# Patient Record
Sex: MALE | Race: WHITE | NOT HISPANIC OR LATINO
[De-identification: names, ages, dates, MRNs, and addresses within clinical notes are randomized per-mention and may not be internally consistent; named-entity substitution may affect disease eponyms.]

---

## 2018-08-30 ENCOUNTER — CHARTING TRANS (OUTPATIENT)
Dept: OTHER | Age: 53
End: 2018-08-30

## 2018-08-31 ENCOUNTER — MYAURORA ACCOUNT LINK (OUTPATIENT)
Dept: OTHER | Age: 53
End: 2018-08-31

## 2018-09-01 ENCOUNTER — CHARTING TRANS (OUTPATIENT)
Dept: OTHER | Age: 53
End: 2018-09-01

## 2018-10-18 ENCOUNTER — IMAGING SERVICES (OUTPATIENT)
Dept: OTHER | Age: 53
End: 2018-10-18

## 2018-10-18 ENCOUNTER — MYAURORA ACCOUNT LINK (OUTPATIENT)
Dept: OTHER | Age: 53
End: 2018-10-18

## 2018-10-18 ENCOUNTER — CHARTING TRANS (OUTPATIENT)
Dept: OTHER | Age: 53
End: 2018-10-18

## 2019-03-05 VITALS
HEIGHT: 75 IN | WEIGHT: 240 LBS | DIASTOLIC BLOOD PRESSURE: 80 MMHG | BODY MASS INDEX: 29.84 KG/M2 | HEART RATE: 92 BPM | TEMPERATURE: 98.2 F | SYSTOLIC BLOOD PRESSURE: 130 MMHG

## 2019-03-12 ENCOUNTER — OFFICE VISIT (OUTPATIENT)
Dept: INTERNAL MEDICINE | Age: 54
End: 2019-03-12

## 2019-03-12 VITALS
WEIGHT: 232 LBS | TEMPERATURE: 98.6 F | HEART RATE: 80 BPM | DIASTOLIC BLOOD PRESSURE: 80 MMHG | HEIGHT: 75 IN | BODY MASS INDEX: 28.85 KG/M2 | SYSTOLIC BLOOD PRESSURE: 130 MMHG

## 2019-03-12 DIAGNOSIS — J06.9 UPPER RESPIRATORY TRACT INFECTION, UNSPECIFIED TYPE: Primary | ICD-10-CM

## 2019-03-12 DIAGNOSIS — J30.2 SEASONAL ALLERGIES: ICD-10-CM

## 2019-03-12 PROCEDURE — 99213 OFFICE O/P EST LOW 20 MIN: CPT | Performed by: INTERNAL MEDICINE

## 2019-03-12 RX ORDER — AZITHROMYCIN 250 MG/1
250 TABLET, FILM COATED ORAL ONCE
Status: DISCONTINUED | OUTPATIENT
Start: 2019-03-12 | End: 2019-03-12 | Stop reason: SDUPTHER

## 2019-03-12 RX ORDER — AZITHROMYCIN 250 MG/1
TABLET, FILM COATED ORAL
Qty: 6 TABLET | Refills: 0 | Status: SHIPPED | OUTPATIENT
Start: 2019-03-12 | End: 2020-03-20 | Stop reason: ALTCHOICE

## 2019-03-12 RX ORDER — LORATADINE 10 MG/1
TABLET ORAL
COMMUNITY
End: 2021-10-18

## 2019-03-12 RX ORDER — METHYLPREDNISOLONE 4 MG
TABLET, DOSE PACK ORAL
Qty: 21 TABLET | Refills: 0 | Status: SHIPPED | OUTPATIENT
Start: 2019-03-12 | End: 2020-03-20 | Stop reason: ALTCHOICE

## 2019-03-12 ASSESSMENT — ENCOUNTER SYMPTOMS
DIAPHORESIS: 0
COUGH: 1
SINUS PRESSURE: 1
CHEST TIGHTNESS: 0
PHOTOPHOBIA: 0
RHINORRHEA: 0
WHEEZING: 1
FATIGUE: 0
APPETITE CHANGE: 0
EYE DISCHARGE: 0
COLOR CHANGE: 0
CHILLS: 0
SINUS PAIN: 1
VOICE CHANGE: 0
EYE REDNESS: 0
FEVER: 1

## 2019-03-14 ASSESSMENT — ENCOUNTER SYMPTOMS
TROUBLE SWALLOWING: 0
SHORTNESS OF BREATH: 0
SORE THROAT: 0

## 2019-10-07 ENCOUNTER — TELEPHONE (OUTPATIENT)
Dept: INTERNAL MEDICINE | Age: 54
End: 2019-10-07

## 2020-03-20 ENCOUNTER — IMAGING SERVICES (OUTPATIENT)
Dept: GENERAL RADIOLOGY | Age: 55
End: 2020-03-20
Attending: INTERNAL MEDICINE

## 2020-03-20 ENCOUNTER — OFFICE VISIT (OUTPATIENT)
Dept: INTERNAL MEDICINE | Age: 55
End: 2020-03-20

## 2020-03-20 ENCOUNTER — TELEPHONE (OUTPATIENT)
Dept: INTERNAL MEDICINE | Age: 55
End: 2020-03-20

## 2020-03-20 VITALS
TEMPERATURE: 97 F | OXYGEN SATURATION: 96 % | HEIGHT: 75 IN | HEART RATE: 87 BPM | BODY MASS INDEX: 26.98 KG/M2 | SYSTOLIC BLOOD PRESSURE: 118 MMHG | DIASTOLIC BLOOD PRESSURE: 72 MMHG | RESPIRATION RATE: 20 BRPM | WEIGHT: 217 LBS

## 2020-03-20 DIAGNOSIS — R09.82 PND (POST-NASAL DRIP): ICD-10-CM

## 2020-03-20 DIAGNOSIS — R05.9 COUGH: ICD-10-CM

## 2020-03-20 DIAGNOSIS — R05.9 COUGH: Primary | ICD-10-CM

## 2020-03-20 DIAGNOSIS — R09.81 NASAL CONGESTION: ICD-10-CM

## 2020-03-20 PROCEDURE — 71046 X-RAY EXAM CHEST 2 VIEWS: CPT | Performed by: RADIOLOGY

## 2020-03-20 PROCEDURE — 99214 OFFICE O/P EST MOD 30 MIN: CPT | Performed by: INTERNAL MEDICINE

## 2020-03-20 RX ORDER — PREDNISONE 10 MG/1
10 TABLET ORAL DAILY
Qty: 18 TABLET | Refills: 0 | Status: SHIPPED | OUTPATIENT
Start: 2020-03-20 | End: 2020-12-15 | Stop reason: ALTCHOICE

## 2020-03-20 RX ORDER — CETIRIZINE HYDROCHLORIDE 10 MG/1
10 TABLET ORAL DAILY
COMMUNITY

## 2020-03-20 SDOH — HEALTH STABILITY: MENTAL HEALTH: HOW OFTEN DO YOU HAVE A DRINK CONTAINING ALCOHOL?: 2-3 TIMES A WEEK

## 2020-03-20 ASSESSMENT — PATIENT HEALTH QUESTIONNAIRE - PHQ9
2. FEELING DOWN, DEPRESSED OR HOPELESS: NOT AT ALL
1. LITTLE INTEREST OR PLEASURE IN DOING THINGS: NOT AT ALL
SUM OF ALL RESPONSES TO PHQ9 QUESTIONS 1 AND 2: 0
SUM OF ALL RESPONSES TO PHQ9 QUESTIONS 1 AND 2: 0

## 2020-12-12 ENCOUNTER — TELEPHONE (OUTPATIENT)
Dept: INTERNAL MEDICINE | Age: 55
End: 2020-12-12

## 2020-12-15 ENCOUNTER — OFFICE VISIT (OUTPATIENT)
Dept: INTERNAL MEDICINE | Age: 55
End: 2020-12-15

## 2020-12-15 VITALS
OXYGEN SATURATION: 98 % | TEMPERATURE: 96.7 F | SYSTOLIC BLOOD PRESSURE: 124 MMHG | BODY MASS INDEX: 29.97 KG/M2 | HEART RATE: 94 BPM | DIASTOLIC BLOOD PRESSURE: 80 MMHG | WEIGHT: 241 LBS | HEIGHT: 75 IN

## 2020-12-15 DIAGNOSIS — G56.22 ULNAR NEUROPATHY OF LEFT UPPER EXTREMITY: Primary | ICD-10-CM

## 2020-12-15 DIAGNOSIS — Z23 NEED FOR INFLUENZA VACCINATION: ICD-10-CM

## 2020-12-15 PROCEDURE — 90686 IIV4 VACC NO PRSV 0.5 ML IM: CPT

## 2020-12-15 PROCEDURE — 90471 IMMUNIZATION ADMIN: CPT

## 2020-12-15 PROCEDURE — 99213 OFFICE O/P EST LOW 20 MIN: CPT | Performed by: INTERNAL MEDICINE

## 2020-12-15 RX ORDER — METHYLPREDNISOLONE 4 MG
TABLET, DOSE PACK ORAL
Qty: 21 TABLET | Refills: 0 | Status: SHIPPED | OUTPATIENT
Start: 2020-12-15 | End: 2021-10-18

## 2020-12-15 SDOH — HEALTH STABILITY: MENTAL HEALTH: HOW OFTEN DO YOU HAVE A DRINK CONTAINING ALCOHOL?: 2-3 TIMES A WEEK

## 2020-12-15 ASSESSMENT — ENCOUNTER SYMPTOMS
HEADACHES: 0
WEAKNESS: 0
CHILLS: 0
FEVER: 0
NUMBNESS: 1
RESPIRATORY NEGATIVE: 1

## 2021-10-18 ENCOUNTER — LAB SERVICES (OUTPATIENT)
Dept: LAB | Age: 56
End: 2021-10-18

## 2021-10-18 ENCOUNTER — IMAGING SERVICES (OUTPATIENT)
Dept: GENERAL RADIOLOGY | Age: 56
End: 2021-10-18
Attending: INTERNAL MEDICINE

## 2021-10-18 ENCOUNTER — OFFICE VISIT (OUTPATIENT)
Dept: INTERNAL MEDICINE | Age: 56
End: 2021-10-18

## 2021-10-18 VITALS
HEART RATE: 105 BPM | TEMPERATURE: 98.4 F | RESPIRATION RATE: 18 BRPM | SYSTOLIC BLOOD PRESSURE: 130 MMHG | HEIGHT: 75 IN | DIASTOLIC BLOOD PRESSURE: 88 MMHG | OXYGEN SATURATION: 96 % | BODY MASS INDEX: 28.72 KG/M2 | WEIGHT: 231 LBS

## 2021-10-18 DIAGNOSIS — Z00.00 ANNUAL PHYSICAL EXAM: ICD-10-CM

## 2021-10-18 DIAGNOSIS — Z12.5 SCREENING PSA (PROSTATE SPECIFIC ANTIGEN): ICD-10-CM

## 2021-10-18 DIAGNOSIS — Z00.00 ANNUAL PHYSICAL EXAM: Primary | ICD-10-CM

## 2021-10-18 DIAGNOSIS — Z53.20 COLONOSCOPY REFUSED: ICD-10-CM

## 2021-10-18 DIAGNOSIS — M53.3 COCCYDYNIA: ICD-10-CM

## 2021-10-18 PROCEDURE — 99396 PREV VISIT EST AGE 40-64: CPT | Performed by: INTERNAL MEDICINE

## 2021-10-18 PROCEDURE — 72220 X-RAY EXAM SACRUM TAILBONE: CPT | Performed by: RADIOLOGY

## 2021-10-18 RX ORDER — ERYTHROMYCIN 250 MG/1
250 TABLET, COATED ORAL 4 TIMES DAILY
COMMUNITY
Start: 2021-10-12

## 2021-10-18 ASSESSMENT — PATIENT HEALTH QUESTIONNAIRE - PHQ9
1. LITTLE INTEREST OR PLEASURE IN DOING THINGS: NOT AT ALL
CLINICAL INTERPRETATION OF PHQ2 SCORE: NO FURTHER SCREENING NEEDED
2. FEELING DOWN, DEPRESSED OR HOPELESS: NOT AT ALL
SUM OF ALL RESPONSES TO PHQ9 QUESTIONS 1 AND 2: 0
CLINICAL INTERPRETATION OF PHQ9 SCORE: NO FURTHER SCREENING NEEDED
SUM OF ALL RESPONSES TO PHQ9 QUESTIONS 1 AND 2: 0

## 2021-10-18 ASSESSMENT — PAIN SCALES - GENERAL: PAINLEVEL: 0

## 2021-10-20 ENCOUNTER — E-ADVICE (OUTPATIENT)
Dept: INTERNAL MEDICINE | Age: 56
End: 2021-10-20

## 2021-10-20 RX ORDER — ALBUTEROL SULFATE 90 UG/1
2 AEROSOL, METERED RESPIRATORY (INHALATION) EVERY 4 HOURS PRN
Qty: 1 EACH | Refills: 0 | Status: SHIPPED | OUTPATIENT
Start: 2021-10-20 | End: 2022-05-14 | Stop reason: SDUPTHER

## 2021-10-21 DIAGNOSIS — M47.818 OSTEOARTHRITIS OF SACRAL AND SACROCOCCYGEAL SPINAL REGION, UNSPECIFIED SPINAL OSTEOARTHRITIS COMPLICATION STATUS: Primary | ICD-10-CM

## 2021-10-30 ENCOUNTER — LAB SERVICES (OUTPATIENT)
Dept: LAB | Age: 56
End: 2021-10-30

## 2021-10-30 DIAGNOSIS — Z12.5 SCREENING PSA (PROSTATE SPECIFIC ANTIGEN): ICD-10-CM

## 2021-10-30 DIAGNOSIS — Z00.00 ANNUAL PHYSICAL EXAM: ICD-10-CM

## 2021-10-30 LAB
ALBUMIN SERPL-MCNC: 4.2 G/DL (ref 3.6–5.1)
ALP SERPL-CCNC: 51 U/L (ref 45–115)
ALT SERPL W/O P-5'-P-CCNC: 46 U/L (ref 5–49)
AST SERPL-CCNC: 31 U/L (ref 14–43)
BASOPHIL %: 0.6 % (ref 0–1.2)
BASOPHIL ABSOLUTE #: 0.1 10*3/UL (ref 0–0.1)
BILIRUB SERPL-MCNC: 0.5 MG/DL (ref 0–1.3)
BUN SERPL-MCNC: 17 MG/DL (ref 6–27)
CALCIUM SERPL-MCNC: 9.5 MG/DL (ref 8.6–10.6)
CHLORIDE SERPL-SCNC: 101 MMOL/L (ref 96–107)
CHOLEST SERPL-MCNC: 97 MG/DL (ref 140–200)
CO2 SERPL-SCNC: 27 MMOL/L (ref 22–32)
CREAT SERPL-MCNC: 0.7 MG/DL (ref 0.6–1.6)
DIFFERENTIAL TYPE: ABNORMAL
EOSINOPHIL %: 3.7 % (ref 0–10)
EOSINOPHIL ABSOLUTE #: 0.4 10*3/UL (ref 0–0.5)
EST. AVERAGE GLUCOSE BLD GHB EST-MCNC: 131 MG/DL (ref 0–154)
GFR SERPL CREATININE-BSD FRML MDRD: >60 ML/MIN/{1.73M2}
GFR SERPL CREATININE-BSD FRML MDRD: >60 ML/MIN/{1.73M2}
GLUCOSE P FAST SERPL-MCNC: 126 MG/DL (ref 60–100)
HBA1C MFR BLD: 6.2 % (ref 4.2–6)
HDLC SERPL-MCNC: 39 MG/DL
HEMATOCRIT: 43.9 % (ref 40–51)
HEMOGLOBIN: 14.6 G/DL (ref 13.7–17.5)
IMMATURE GRANULOCYTE ABSOLUTE: 0.12 10*3/UL (ref 0–0.05)
IMMATURE GRANULOCYTE PERCENT: 1.2 % (ref 0–0.5)
LDLC SERPL CALC-MCNC: 45 MG/DL (ref 30–100)
LYMPH PERCENT: 29.2 % (ref 20.5–51.1)
LYMPHOCYTE ABSOLUTE #: 3 10*3/UL (ref 1.2–3.4)
MEAN CORPUSCULAR HGB CONCENTRATION: 33.3 % (ref 32–36)
MEAN CORPUSCULAR HGB: 28.6 PG (ref 27–34)
MEAN CORPUSCULAR VOLUME: 86.1 FL (ref 79–95)
MEAN PLATELET VOLUME: 10.2 FL (ref 8.6–12.4)
MONOCYTE ABSOLUTE #: 0.8 10*3/UL (ref 0.2–0.9)
MONOCYTE PERCENT: 7.5 % (ref 4.3–12.9)
NEUTROPHIL ABSOLUTE #: 5.9 10*3/UL (ref 1.4–6.5)
NEUTROPHIL PERCENT: 57.8 % (ref 34–73.5)
PLATELET COUNT: 355 10*3/UL (ref 150–400)
POTASSIUM SERPL-SCNC: 4.3 MMOL/L (ref 3.5–5.3)
PROT SERPL-MCNC: 7 G/DL (ref 6.4–8.5)
PSA SERPL-MCNC: 0.5 NG/ML (ref 0–3.2)
RED BLOOD CELL COUNT: 5.1 10*6/UL (ref 3.9–5.7)
RED CELL DISTRIBUTION WIDTH: 13.7 % (ref 11.3–14.8)
SODIUM SERPL-SCNC: 139 MMOL/L (ref 136–146)
TRIGL SERPL-MCNC: 65 MG/DL (ref 0–200)
TSH SERPL DL<=0.05 MIU/L-ACNC: 1.17 M[IU]/L (ref 0.3–4.82)
WHITE BLOOD CELL COUNT: 10.2 10*3/UL (ref 4–10)

## 2021-10-30 PROCEDURE — 80050 GENERAL HEALTH PANEL: CPT | Performed by: INTERNAL MEDICINE

## 2021-10-30 PROCEDURE — 36415 COLL VENOUS BLD VENIPUNCTURE: CPT | Performed by: INTERNAL MEDICINE

## 2021-10-30 PROCEDURE — G0103 PSA SCREENING: HCPCS | Performed by: INTERNAL MEDICINE

## 2021-10-30 PROCEDURE — 80061 LIPID PANEL: CPT | Performed by: INTERNAL MEDICINE

## 2021-10-30 PROCEDURE — 83036 HEMOGLOBIN GLYCOSYLATED A1C: CPT | Performed by: INTERNAL MEDICINE

## 2021-11-01 DIAGNOSIS — R73.09 ELEVATED GLUCOSE: Primary | ICD-10-CM

## 2021-11-26 ENCOUNTER — TELEPHONE (OUTPATIENT)
Dept: NUTRITION | Age: 56
End: 2021-11-26

## 2022-05-14 RX ORDER — ALBUTEROL SULFATE 90 UG/1
AEROSOL, METERED RESPIRATORY (INHALATION)
Qty: 8.5 G | Refills: 0 | Status: SHIPPED | OUTPATIENT
Start: 2022-05-14

## 2022-06-13 ENCOUNTER — WALK IN (OUTPATIENT)
Dept: URGENT CARE | Age: 57
End: 2022-06-13

## 2022-06-13 VITALS
RESPIRATION RATE: 16 BRPM | OXYGEN SATURATION: 96 % | DIASTOLIC BLOOD PRESSURE: 70 MMHG | SYSTOLIC BLOOD PRESSURE: 130 MMHG | HEART RATE: 88 BPM | TEMPERATURE: 97.3 F

## 2022-06-13 DIAGNOSIS — U07.1 COVID-19 VIRUS INFECTION: Primary | ICD-10-CM

## 2022-06-13 PROCEDURE — 99214 OFFICE O/P EST MOD 30 MIN: CPT | Performed by: INTERNAL MEDICINE

## 2022-06-13 RX ORDER — DEXLANSOPRAZOLE 60 MG/1
60 CAPSULE, DELAYED RELEASE ORAL DAILY
COMMUNITY
Start: 2022-05-14

## 2022-06-13 RX ORDER — ALBUTEROL SULFATE 90 UG/1
AEROSOL, METERED RESPIRATORY (INHALATION)
Qty: 1 EACH | Refills: 0 | Status: SHIPPED | OUTPATIENT
Start: 2022-06-13

## 2022-06-13 RX ORDER — PREDNISONE 20 MG/1
40 TABLET ORAL DAILY
Qty: 10 TABLET | Refills: 0 | Status: SHIPPED | OUTPATIENT
Start: 2022-06-13 | End: 2022-06-18

## 2022-11-15 PROBLEM — M53.3 COCCYDYNIA: Status: ACTIVE | Noted: 2021-10-18

## 2022-11-15 PROBLEM — J30.2 SEASONAL ALLERGIES: Status: ACTIVE | Noted: 2022-11-15

## 2022-12-13 ENCOUNTER — LAB ENCOUNTER (OUTPATIENT)
Dept: LAB | Age: 57
End: 2022-12-13
Attending: STUDENT IN AN ORGANIZED HEALTH CARE EDUCATION/TRAINING PROGRAM
Payer: COMMERCIAL

## 2022-12-13 DIAGNOSIS — Z01.818 PRE-OP TESTING: ICD-10-CM

## 2022-12-13 DIAGNOSIS — R12 HEARTBURN: ICD-10-CM

## 2022-12-13 LAB
ALBUMIN SERPL-MCNC: 3.9 G/DL (ref 3.4–5)
ALBUMIN/GLOB SERPL: 1.1 {RATIO} (ref 1–2)
ALP LIVER SERPL-CCNC: 48 U/L
ALT SERPL-CCNC: 109 U/L
ANION GAP SERPL CALC-SCNC: 7 MMOL/L (ref 0–18)
AST SERPL-CCNC: 73 U/L (ref 15–37)
BASOPHILS # BLD AUTO: 0.02 X10(3) UL (ref 0–0.2)
BASOPHILS NFR BLD AUTO: 0.2 %
BILIRUB SERPL-MCNC: 0.8 MG/DL (ref 0.1–2)
BUN BLD-MCNC: 13 MG/DL (ref 7–18)
CALCIUM BLD-MCNC: 9.1 MG/DL (ref 8.5–10.1)
CHLORIDE SERPL-SCNC: 107 MMOL/L (ref 98–112)
CO2 SERPL-SCNC: 26 MMOL/L (ref 21–32)
CREAT BLD-MCNC: 0.85 MG/DL
EOSINOPHIL # BLD AUTO: 0.19 X10(3) UL (ref 0–0.7)
EOSINOPHIL NFR BLD AUTO: 2.3 %
ERYTHROCYTE [DISTWIDTH] IN BLOOD BY AUTOMATED COUNT: 13.7 %
FASTING STATUS PATIENT QL REPORTED: YES
GFR SERPLBLD BASED ON 1.73 SQ M-ARVRAT: 103 ML/MIN/1.73M2 (ref 60–?)
GLOBULIN PLAS-MCNC: 3.4 G/DL (ref 2.8–4.4)
GLUCOSE BLD-MCNC: 138 MG/DL (ref 70–99)
HCT VFR BLD AUTO: 45.4 %
HGB BLD-MCNC: 15.2 G/DL
IMM GRANULOCYTES # BLD AUTO: 0.03 X10(3) UL (ref 0–1)
IMM GRANULOCYTES NFR BLD: 0.4 %
LYMPHOCYTES # BLD AUTO: 1.96 X10(3) UL (ref 1–4)
LYMPHOCYTES NFR BLD AUTO: 24.1 %
MCH RBC QN AUTO: 29 PG (ref 26–34)
MCHC RBC AUTO-ENTMCNC: 33.5 G/DL (ref 31–37)
MCV RBC AUTO: 86.5 FL
MONOCYTES # BLD AUTO: 0.61 X10(3) UL (ref 0.1–1)
MONOCYTES NFR BLD AUTO: 7.5 %
NEUTROPHILS # BLD AUTO: 5.33 X10 (3) UL (ref 1.5–7.7)
NEUTROPHILS # BLD AUTO: 5.33 X10(3) UL (ref 1.5–7.7)
NEUTROPHILS NFR BLD AUTO: 65.5 %
OSMOLALITY SERPL CALC.SUM OF ELEC: 292 MOSM/KG (ref 275–295)
PLATELET # BLD AUTO: 279 10(3)UL (ref 150–450)
POTASSIUM SERPL-SCNC: 3.8 MMOL/L (ref 3.5–5.1)
PROT SERPL-MCNC: 7.3 G/DL (ref 6.4–8.2)
RBC # BLD AUTO: 5.25 X10(6)UL
SODIUM SERPL-SCNC: 140 MMOL/L (ref 136–145)
WBC # BLD AUTO: 8.1 X10(3) UL (ref 4–11)

## 2022-12-13 PROCEDURE — 36415 COLL VENOUS BLD VENIPUNCTURE: CPT

## 2022-12-13 PROCEDURE — 80053 COMPREHEN METABOLIC PANEL: CPT

## 2022-12-13 PROCEDURE — 85025 COMPLETE CBC W/AUTO DIFF WBC: CPT

## 2022-12-14 LAB — SARS-COV-2 RNA RESP QL NAA+PROBE: NOT DETECTED

## 2022-12-14 NOTE — PROGRESS NOTES
RUQ US  CMP repeat; hepatitis panel, MICKI/ASMA,AMA; a1at; ceruloplasmin, ferritin, IgG;     ----      Raza Michelle,    Your recent blood work shows a normal red blood cell count. Your liver enzymes are elevated. I recommend additional blood work and an ultrasound of your liver for further evaluation. I have placed these orders for you. I will plan to see you on Friday for your upper endoscopy and colonoscopy,       Your blood sugar is high, I recommend you follow-up with your primary care physician for further evaluation of this. Please let me know if you have any questions or concerns.      Take care and Sincerely,  Casey Murphy MD

## 2022-12-16 PROBLEM — R12 HEARTBURN: Status: ACTIVE | Noted: 2022-12-16

## 2022-12-16 PROBLEM — K22.2 ESOPHAGEAL STRICTURE: Status: ACTIVE | Noted: 2022-12-16

## 2022-12-16 PROBLEM — K20.80 CORROSIVE ESOPHAGITIS: Status: ACTIVE | Noted: 2022-12-16

## 2022-12-16 PROBLEM — Z12.11 SPECIAL SCREENING FOR MALIGNANT NEOPLASM OF COLON: Status: ACTIVE | Noted: 2022-12-16

## 2023-02-09 ENCOUNTER — LAB ENCOUNTER (OUTPATIENT)
Dept: LAB | Age: 58
End: 2023-02-09
Attending: STUDENT IN AN ORGANIZED HEALTH CARE EDUCATION/TRAINING PROGRAM
Payer: COMMERCIAL

## 2023-02-09 ENCOUNTER — HOSPITAL ENCOUNTER (OUTPATIENT)
Dept: ULTRASOUND IMAGING | Age: 58
Discharge: HOME | End: 2023-02-09
Attending: STUDENT IN AN ORGANIZED HEALTH CARE EDUCATION/TRAINING PROGRAM
Payer: COMMERCIAL

## 2023-02-09 DIAGNOSIS — R74.8 ELEVATED LIVER ENZYMES: Primary | ICD-10-CM

## 2023-02-09 DIAGNOSIS — R74.8 ELEVATED LIVER ENZYMES: ICD-10-CM

## 2023-02-09 LAB
CERULOPLASMIN SERPL-MCNC: 23.8 MG/DL (ref 20–60)
DEPRECATED HBV CORE AB SER IA-ACNC: 60.6 NG/ML
HAV IGM SER QL: NONREACTIVE
HBV CORE AB SERPL QL IA: NONREACTIVE
HBV CORE IGM SER QL: NONREACTIVE
HBV SURFACE AG SERPL QL IA: NONREACTIVE
HCV AB SERPL QL IA: NONREACTIVE
IMMUNOGLOBULIN PNL SER-MCNC: 798 MG/DL (ref 791–1643)

## 2023-02-09 PROCEDURE — 86038 ANTINUCLEAR ANTIBODIES: CPT

## 2023-02-09 PROCEDURE — 82103 ALPHA-1-ANTITRYPSIN TOTAL: CPT

## 2023-02-09 PROCEDURE — 86704 HEP B CORE ANTIBODY TOTAL: CPT

## 2023-02-09 PROCEDURE — 86225 DNA ANTIBODY NATIVE: CPT

## 2023-02-09 PROCEDURE — 83516 IMMUNOASSAY NONANTIBODY: CPT

## 2023-02-09 PROCEDURE — 76700 US EXAM ABDOM COMPLETE: CPT | Performed by: STUDENT IN AN ORGANIZED HEALTH CARE EDUCATION/TRAINING PROGRAM

## 2023-02-09 PROCEDURE — 80074 ACUTE HEPATITIS PANEL: CPT

## 2023-02-09 PROCEDURE — 82784 ASSAY IGA/IGD/IGG/IGM EACH: CPT

## 2023-02-09 PROCEDURE — 82728 ASSAY OF FERRITIN: CPT

## 2023-02-09 PROCEDURE — 82390 ASSAY OF CERULOPLASMIN: CPT

## 2023-02-09 PROCEDURE — 36415 COLL VENOUS BLD VENIPUNCTURE: CPT

## 2023-02-10 LAB
DSDNA IGG SERPL IA-ACNC: <0.6 IU/ML
ENA AB SER QL IA: 1.3 UG/L
ENA AB SER QL IA: POSITIVE

## 2023-02-11 LAB
F-ACTIN (SMOOTH MUSCLE) AB: 4 UNITS
MITOCHONDRIAL M2 AB, IGG: 2.3 UNITS

## 2023-02-16 LAB
ALPHA 1 ANTITRYPSIN S ALLELE: NEGATIVE
ALPHA 1 ANTITYPSIN Z ALLELE: NEGATIVE
ALPHA-1-ANTITRYPSIN: 149 MG/DL

## 2023-03-02 ENCOUNTER — OFFICE VISIT (OUTPATIENT)
Dept: INTERNAL MEDICINE CLINIC | Facility: CLINIC | Age: 58
End: 2023-03-02
Payer: COMMERCIAL

## 2023-03-02 VITALS
TEMPERATURE: 97 F | BODY MASS INDEX: 28.77 KG/M2 | WEIGHT: 224.19 LBS | OXYGEN SATURATION: 97 % | RESPIRATION RATE: 16 BRPM | SYSTOLIC BLOOD PRESSURE: 128 MMHG | HEIGHT: 74 IN | DIASTOLIC BLOOD PRESSURE: 76 MMHG | HEART RATE: 82 BPM

## 2023-03-02 DIAGNOSIS — Z13.220 SCREENING FOR LIPID DISORDERS: ICD-10-CM

## 2023-03-02 DIAGNOSIS — Z13.29 SCREENING FOR THYROID DISORDER: ICD-10-CM

## 2023-03-02 DIAGNOSIS — Z12.5 SCREENING FOR PROSTATE CANCER: ICD-10-CM

## 2023-03-02 DIAGNOSIS — K21.00 GASTROESOPHAGEAL REFLUX DISEASE WITH ESOPHAGITIS WITHOUT HEMORRHAGE: ICD-10-CM

## 2023-03-02 DIAGNOSIS — R73.03 PREDIABETES: ICD-10-CM

## 2023-03-02 DIAGNOSIS — I34.1 MVP (MITRAL VALVE PROLAPSE): ICD-10-CM

## 2023-03-02 DIAGNOSIS — Z00.00 LABORATORY EXAMINATION ORDERED AS PART OF A COMPLETE PHYSICAL EXAMINATION: Primary | ICD-10-CM

## 2023-03-02 DIAGNOSIS — Z13.228 SCREENING FOR METABOLIC DISORDER: ICD-10-CM

## 2023-03-02 DIAGNOSIS — Z13.0 SCREENING FOR BLOOD DISEASE: ICD-10-CM

## 2023-03-02 PROCEDURE — 3008F BODY MASS INDEX DOCD: CPT | Performed by: INTERNAL MEDICINE

## 2023-03-02 PROCEDURE — 3078F DIAST BP <80 MM HG: CPT | Performed by: INTERNAL MEDICINE

## 2023-03-02 PROCEDURE — 99204 OFFICE O/P NEW MOD 45 MIN: CPT | Performed by: INTERNAL MEDICINE

## 2023-03-02 PROCEDURE — 3074F SYST BP LT 130 MM HG: CPT | Performed by: INTERNAL MEDICINE

## 2023-03-02 RX ORDER — ERYTHROMYCIN 250 MG/1
250 TABLET, COATED ORAL AS NEEDED
COMMUNITY
Start: 2023-01-11

## 2023-03-07 ENCOUNTER — LAB ENCOUNTER (OUTPATIENT)
Dept: LAB | Age: 58
End: 2023-03-07
Attending: STUDENT IN AN ORGANIZED HEALTH CARE EDUCATION/TRAINING PROGRAM
Payer: COMMERCIAL

## 2023-03-07 ENCOUNTER — HOSPITAL ENCOUNTER (OUTPATIENT)
Dept: ULTRASOUND IMAGING | Age: 58
Discharge: HOME | End: 2023-03-07
Attending: STUDENT IN AN ORGANIZED HEALTH CARE EDUCATION/TRAINING PROGRAM
Payer: COMMERCIAL

## 2023-03-07 DIAGNOSIS — K76.0 FATTY LIVER: ICD-10-CM

## 2023-03-07 DIAGNOSIS — Z13.228 SCREENING FOR METABOLIC DISORDER: ICD-10-CM

## 2023-03-07 DIAGNOSIS — Z13.29 SCREENING FOR THYROID DISORDER: ICD-10-CM

## 2023-03-07 DIAGNOSIS — Z13.0 SCREENING FOR BLOOD DISEASE: ICD-10-CM

## 2023-03-07 DIAGNOSIS — R73.03 PREDIABETES: ICD-10-CM

## 2023-03-07 DIAGNOSIS — Z00.00 LABORATORY EXAMINATION ORDERED AS PART OF A COMPLETE PHYSICAL EXAMINATION: ICD-10-CM

## 2023-03-07 DIAGNOSIS — Z12.5 SCREENING FOR PROSTATE CANCER: ICD-10-CM

## 2023-03-07 DIAGNOSIS — Z13.220 SCREENING FOR LIPID DISORDERS: ICD-10-CM

## 2023-03-07 LAB
AFP-TM SERPL-MCNC: 1.4 NG/ML (ref ?–8)
ALBUMIN SERPL-MCNC: 3.8 G/DL (ref 3.4–5)
ALBUMIN/GLOB SERPL: 1.2 {RATIO} (ref 1–2)
ALP LIVER SERPL-CCNC: 45 U/L
ALT SERPL-CCNC: 71 U/L
ANION GAP SERPL CALC-SCNC: 5 MMOL/L (ref 0–18)
AST SERPL-CCNC: 37 U/L (ref 15–37)
BASOPHILS # BLD AUTO: 0.02 X10(3) UL (ref 0–0.2)
BASOPHILS NFR BLD AUTO: 0.3 %
BILIRUB SERPL-MCNC: 0.7 MG/DL (ref 0.1–2)
BUN BLD-MCNC: 14 MG/DL (ref 7–18)
CALCIUM BLD-MCNC: 9 MG/DL (ref 8.5–10.1)
CHLORIDE SERPL-SCNC: 106 MMOL/L (ref 98–112)
CHOLEST SERPL-MCNC: 83 MG/DL (ref ?–200)
CO2 SERPL-SCNC: 29 MMOL/L (ref 21–32)
COMPLEXED PSA SERPL-MCNC: 0.43 NG/ML (ref ?–4)
CREAT BLD-MCNC: 0.77 MG/DL
EOSINOPHIL # BLD AUTO: 0.25 X10(3) UL (ref 0–0.7)
EOSINOPHIL NFR BLD AUTO: 3.7 %
ERYTHROCYTE [DISTWIDTH] IN BLOOD BY AUTOMATED COUNT: 13.8 %
EST. AVERAGE GLUCOSE BLD GHB EST-MCNC: 134 MG/DL (ref 68–126)
FASTING PATIENT LIPID ANSWER: YES
FASTING STATUS PATIENT QL REPORTED: YES
GFR SERPLBLD BASED ON 1.73 SQ M-ARVRAT: 106 ML/MIN/1.73M2 (ref 60–?)
GLOBULIN PLAS-MCNC: 3.3 G/DL (ref 2.8–4.4)
GLUCOSE BLD-MCNC: 112 MG/DL (ref 70–99)
HBA1C MFR BLD: 6.3 % (ref ?–5.7)
HCT VFR BLD AUTO: 43 %
HDLC SERPL-MCNC: 36 MG/DL (ref 40–59)
HGB BLD-MCNC: 14.6 G/DL
IMM GRANULOCYTES # BLD AUTO: 0.02 X10(3) UL (ref 0–1)
IMM GRANULOCYTES NFR BLD: 0.3 %
LDLC SERPL CALC-MCNC: 34 MG/DL (ref ?–100)
LYMPHOCYTES # BLD AUTO: 2.13 X10(3) UL (ref 1–4)
LYMPHOCYTES NFR BLD AUTO: 31.6 %
MCH RBC QN AUTO: 28.5 PG (ref 26–34)
MCHC RBC AUTO-ENTMCNC: 34 G/DL (ref 31–37)
MCV RBC AUTO: 84 FL
MONOCYTES # BLD AUTO: 0.53 X10(3) UL (ref 0.1–1)
MONOCYTES NFR BLD AUTO: 7.9 %
NEUTROPHILS # BLD AUTO: 3.78 X10 (3) UL (ref 1.5–7.7)
NEUTROPHILS # BLD AUTO: 3.78 X10(3) UL (ref 1.5–7.7)
NEUTROPHILS NFR BLD AUTO: 56.2 %
NONHDLC SERPL-MCNC: 47 MG/DL (ref ?–130)
OSMOLALITY SERPL CALC.SUM OF ELEC: 291 MOSM/KG (ref 275–295)
PLATELET # BLD AUTO: 260 10(3)UL (ref 150–450)
POTASSIUM SERPL-SCNC: 3.6 MMOL/L (ref 3.5–5.1)
PROT SERPL-MCNC: 7.1 G/DL (ref 6.4–8.2)
RBC # BLD AUTO: 5.12 X10(6)UL
SODIUM SERPL-SCNC: 140 MMOL/L (ref 136–145)
TRIGL SERPL-MCNC: 50 MG/DL (ref 30–149)
TSI SER-ACNC: 1.26 MIU/ML (ref 0.36–3.74)
VLDLC SERPL CALC-MCNC: 7 MG/DL (ref 0–30)
WBC # BLD AUTO: 6.7 X10(3) UL (ref 4–11)

## 2023-03-07 PROCEDURE — 76705 ECHO EXAM OF ABDOMEN: CPT | Performed by: STUDENT IN AN ORGANIZED HEALTH CARE EDUCATION/TRAINING PROGRAM

## 2023-03-07 PROCEDURE — 76981 USE PARENCHYMA: CPT | Performed by: STUDENT IN AN ORGANIZED HEALTH CARE EDUCATION/TRAINING PROGRAM

## 2023-03-07 PROCEDURE — 80061 LIPID PANEL: CPT

## 2023-03-07 PROCEDURE — 82105 ALPHA-FETOPROTEIN SERUM: CPT

## 2023-03-07 PROCEDURE — 83036 HEMOGLOBIN GLYCOSYLATED A1C: CPT

## 2023-03-07 PROCEDURE — 85025 COMPLETE CBC W/AUTO DIFF WBC: CPT

## 2023-03-07 PROCEDURE — 80053 COMPREHEN METABOLIC PANEL: CPT

## 2023-03-07 PROCEDURE — 84443 ASSAY THYROID STIM HORMONE: CPT

## 2023-03-07 PROCEDURE — 36415 COLL VENOUS BLD VENIPUNCTURE: CPT

## 2023-03-14 NOTE — PROGRESS NOTES
RUSELMA OGDEN 1 year    ---      Raaz Michelle,    Your recent ultrasound showed a fatty liver. This is where extra fat deposits within the liver. However, if fatty liver is left untreated, this can potentially lead to other liver and heart issues and conditions. I recommend that you follow a low fat and low calorie diet, with a diet high in fruits and vegetables. I also recommend that you lose about 6-8% of your body weight with diet and exercise in the next 6-12 months, to help minimize the risk of fatty liver. There are also multiple cysts noted in your liver. I recommend we repeat this ultrasound in 1 year to assess for any changes in your liver and surveillance of cysts of the liver. Please let me know if you have any questions or concerns.      Sincerely,  Cheryl Lauren MD

## 2023-03-14 NOTE — PROGRESS NOTES
Cj Clifton,    Your recent blood work for alpha-fetoprotein is normal.     Please let me know if you have any questions or concerns.      Sincerely,  Cecile Spivey MD

## 2023-03-15 ENCOUNTER — WALK IN (OUTPATIENT)
Dept: URGENT CARE | Age: 58
End: 2023-03-15

## 2023-03-15 VITALS
OXYGEN SATURATION: 98 % | DIASTOLIC BLOOD PRESSURE: 96 MMHG | TEMPERATURE: 98.7 F | HEART RATE: 108 BPM | RESPIRATION RATE: 16 BRPM | SYSTOLIC BLOOD PRESSURE: 137 MMHG

## 2023-03-15 DIAGNOSIS — H10.9 CONJUNCTIVITIS OF RIGHT EYE, UNSPECIFIED CONJUNCTIVITIS TYPE: Primary | ICD-10-CM

## 2023-03-15 DIAGNOSIS — J01.90 ACUTE NON-RECURRENT SINUSITIS, UNSPECIFIED LOCATION: ICD-10-CM

## 2023-03-15 PROCEDURE — 99214 OFFICE O/P EST MOD 30 MIN: CPT | Performed by: FAMILY MEDICINE

## 2023-03-15 RX ORDER — TOBRAMYCIN 3 MG/ML
SOLUTION/ DROPS OPHTHALMIC
Qty: 1 EACH | Refills: 0 | Status: SHIPPED | OUTPATIENT
Start: 2023-03-15

## 2023-03-15 RX ORDER — AZITHROMYCIN 250 MG/1
TABLET, FILM COATED ORAL
Qty: 6 TABLET | Refills: 0 | Status: SHIPPED | OUTPATIENT
Start: 2023-03-15 | End: 2023-03-20

## 2023-03-15 RX ORDER — PREDNISONE 20 MG/1
40 TABLET ORAL DAILY
Qty: 10 TABLET | Refills: 0 | Status: SHIPPED | OUTPATIENT
Start: 2023-03-15 | End: 2023-03-20

## 2023-04-20 ENCOUNTER — HOSPITAL ENCOUNTER (OUTPATIENT)
Dept: CV DIAGNOSTICS | Facility: HOSPITAL | Age: 58
Discharge: HOME | End: 2023-04-20
Attending: INTERNAL MEDICINE
Payer: COMMERCIAL

## 2023-04-20 DIAGNOSIS — I34.1 MVP (MITRAL VALVE PROLAPSE): ICD-10-CM

## 2023-04-20 PROCEDURE — 93306 TTE W/DOPPLER COMPLETE: CPT | Performed by: INTERNAL MEDICINE

## 2023-05-11 ENCOUNTER — TELEMEDICINE (OUTPATIENT)
Dept: INTERNAL MEDICINE CLINIC | Facility: CLINIC | Age: 58
End: 2023-05-11
Payer: COMMERCIAL

## 2023-05-11 DIAGNOSIS — U07.1 COVID-19: Primary | ICD-10-CM

## 2023-05-11 DIAGNOSIS — R06.02 SHORTNESS OF BREATH: ICD-10-CM

## 2023-05-11 PROCEDURE — 99213 OFFICE O/P EST LOW 20 MIN: CPT | Performed by: INTERNAL MEDICINE

## 2023-05-11 RX ORDER — ALBUTEROL SULFATE 90 UG/1
1 AEROSOL, METERED RESPIRATORY (INHALATION) EVERY 6 HOURS PRN
Qty: 1 EACH | Refills: 0 | Status: SHIPPED | OUTPATIENT
Start: 2023-05-11

## 2023-05-11 RX ORDER — PREDNISONE 20 MG/1
20 TABLET ORAL DAILY
Qty: 5 TABLET | Refills: 0 | Status: SHIPPED | OUTPATIENT
Start: 2023-05-11 | End: 2023-05-16

## 2023-07-05 ENCOUNTER — TELEPHONE (OUTPATIENT)
Dept: INTERNAL MEDICINE CLINIC | Facility: CLINIC | Age: 58
End: 2023-07-05

## 2023-07-05 ENCOUNTER — OFFICE VISIT (OUTPATIENT)
Dept: FAMILY MEDICINE CLINIC | Facility: CLINIC | Age: 58
End: 2023-07-05
Payer: COMMERCIAL

## 2023-07-05 VITALS
HEART RATE: 71 BPM | HEIGHT: 75 IN | BODY MASS INDEX: 28.35 KG/M2 | SYSTOLIC BLOOD PRESSURE: 126 MMHG | WEIGHT: 228 LBS | TEMPERATURE: 98 F | RESPIRATION RATE: 16 BRPM | OXYGEN SATURATION: 96 % | DIASTOLIC BLOOD PRESSURE: 80 MMHG

## 2023-07-05 DIAGNOSIS — K64.5 EXTERNAL HEMORRHOID, THROMBOSED: Primary | ICD-10-CM

## 2023-07-05 PROCEDURE — 3008F BODY MASS INDEX DOCD: CPT | Performed by: NURSE PRACTITIONER

## 2023-07-05 PROCEDURE — 3074F SYST BP LT 130 MM HG: CPT | Performed by: NURSE PRACTITIONER

## 2023-07-05 PROCEDURE — 3079F DIAST BP 80-89 MM HG: CPT | Performed by: NURSE PRACTITIONER

## 2023-07-05 PROCEDURE — 99213 OFFICE O/P EST LOW 20 MIN: CPT | Performed by: NURSE PRACTITIONER

## 2023-07-05 RX ORDER — PANTOPRAZOLE SODIUM 40 MG/1
40 TABLET, DELAYED RELEASE ORAL
COMMUNITY
Start: 2023-06-23

## 2023-07-05 RX ORDER — HYDROCORTISONE 25 MG/G
1 CREAM TOPICAL 2 TIMES DAILY
Qty: 28 G | Refills: 0 | Status: SHIPPED | OUTPATIENT
Start: 2023-07-05 | End: 2023-07-12

## 2023-07-05 NOTE — TELEPHONE ENCOUNTER
Rectal bleeding x 1 day. Had drops of blood in his pants. Thinks it's a hemmohoid. He is en route to UC. Follow up with  him tomorrow please.

## 2023-07-07 ENCOUNTER — OFFICE VISIT (OUTPATIENT)
Dept: INTERNAL MEDICINE CLINIC | Facility: CLINIC | Age: 58
End: 2023-07-07
Payer: COMMERCIAL

## 2023-07-07 VITALS
BODY MASS INDEX: 29 KG/M2 | WEIGHT: 229.81 LBS | OXYGEN SATURATION: 98 % | SYSTOLIC BLOOD PRESSURE: 136 MMHG | HEART RATE: 60 BPM | DIASTOLIC BLOOD PRESSURE: 84 MMHG

## 2023-07-07 DIAGNOSIS — K21.00 GASTROESOPHAGEAL REFLUX DISEASE WITH ESOPHAGITIS WITHOUT HEMORRHAGE: ICD-10-CM

## 2023-07-07 DIAGNOSIS — K64.4 EXTERNAL HEMORRHOID: Primary | ICD-10-CM

## 2023-07-07 DIAGNOSIS — R73.03 PREDIABETES: ICD-10-CM

## 2023-07-07 PROBLEM — I34.1 MVP (MITRAL VALVE PROLAPSE): Status: RESOLVED | Noted: 2023-03-02 | Resolved: 2023-07-07

## 2023-07-07 PROCEDURE — 99214 OFFICE O/P EST MOD 30 MIN: CPT | Performed by: INTERNAL MEDICINE

## 2023-07-07 PROCEDURE — 3079F DIAST BP 80-89 MM HG: CPT | Performed by: INTERNAL MEDICINE

## 2023-07-07 PROCEDURE — 3075F SYST BP GE 130 - 139MM HG: CPT | Performed by: INTERNAL MEDICINE

## 2023-07-10 ENCOUNTER — OFFICE VISIT (OUTPATIENT)
Facility: LOCATION | Age: 58
End: 2023-07-10
Payer: COMMERCIAL

## 2023-07-10 VITALS — TEMPERATURE: 98 F | HEART RATE: 96 BPM

## 2023-07-10 DIAGNOSIS — K64.5 THROMBOSED HEMORRHOIDS: Primary | ICD-10-CM

## 2023-08-14 ENCOUNTER — OFFICE VISIT (OUTPATIENT)
Facility: LOCATION | Age: 58
End: 2023-08-14
Payer: COMMERCIAL

## 2023-08-14 DIAGNOSIS — K64.5 THROMBOSED HEMORRHOIDS: Primary | ICD-10-CM

## 2023-08-14 PROCEDURE — 99213 OFFICE O/P EST LOW 20 MIN: CPT | Performed by: STUDENT IN AN ORGANIZED HEALTH CARE EDUCATION/TRAINING PROGRAM

## 2023-08-15 NOTE — PROGRESS NOTES
Follow Up Visit Note       Active Problems      1. Thrombosed hemorrhoids          Chief Complaint   Patient presents with:  Establish Care: EP - 1 month f/u hemorrhoids, healing well, antibiotic, been taking more bathes, no symptoms. History of Present Illness  This is a very nice 72-year-old gentleman who I met on 7/10/2023 when he was referred to me from his primary care physician, Dr. Eric Savage, with rectal pain secondary to a thrombosed hemorrhoid. I recommended nonoperative therapy with Anusol, sitz bath's and high-fiber diet. Patient is doing very well at this time. He is no longer having any anorectal pain. He is no longer using any topical medication. He is not having any bleeding, prolapse, seepage, itching or incontinence. He is still doing sitz bath's. Allergies  Miguel Angel is allergic to penicillins. Past Medical / Surgical / Social / Family History    The past medical and past surgical history have been reviewed by me today. Past Medical History:   Diagnosis Date    Belching     Esophageal reflux     Heartburn     Indigestion     Shortness of breath     Wears glasses      Past Surgical History:   Procedure Laterality Date    COLONOSCOPY  12/2022    HERNIA SURGERY         The family history and social history have been reviewed by me today. Family History   Problem Relation Age of Onset    Cancer Father     Diabetes Mother     Diabetes Son     Dementia Maternal Grandmother      Social History    Socioeconomic History      Marital status:     Tobacco Use      Smoking status: Never      Smokeless tobacco: Never    Substance and Sexual Activity      Alcohol use: Yes        Alcohol/week: 6.0 standard drinks of alcohol        Types: 6 Cans of beer per week      Drug use: Never    Other Topics      Concerns:        Weight Concern: Yes       Current Outpatient Medications:     Lidocaine 4 % External Ointment, Apply 50 g topically in the morning, at noon, and at bedtime. , Disp: 50 g, Rfl: 0    pantoprazole 40 MG Oral Tab EC, Take 1 tablet (40 mg total) by mouth before breakfast., Disp: , Rfl:     fluticasone propionate 50 MCG/ACT Nasal Suspension, by Each Nare route daily. , Disp: , Rfl:     albuterol 108 (90 Base) MCG/ACT Inhalation Aero Soln, Inhale 2 puffs into the lungs every 4 (four) hours as needed. , Disp: , Rfl:     cetirizine 10 MG Oral Tab, Take 1 tablet (10 mg total) by mouth daily. , Disp: , Rfl:      Review of Systems  A 10 point review of systems was performed and negative unless otherwise documented per HPI. Physical Findings   There were no vitals taken for this visit. Physical Exam  Vitals and nursing note reviewed. Exam conducted with a chaperone present. Constitutional:       General: He is not in acute distress. HENT:      Head: Normocephalic and atraumatic. Mouth/Throat:      Mouth: Mucous membranes are moist.   Cardiovascular:      Rate and Rhythm: Normal rate and regular rhythm. Pulmonary:      Effort: Pulmonary effort is normal.   Abdominal:      General: There is no distension. Palpations: Abdomen is soft. Tenderness: There is no abdominal tenderness. Genitourinary:     Comments: Patient examined in the prone jackknife position with nurse chaperone present. External exam of the anus with gentle spreading of the anoderm reveals no evidence of residual external hemorrhoid disease. Digital rectal exam shows very strong sphincter tone with no masses or bleeding. Anoscopy deferred. Musculoskeletal:         General: No deformity. Skin:     General: Skin is warm and dry. Neurological:      General: No focal deficit present. Mental Status: He is alert.    Psychiatric:         Mood and Affect: Mood normal.          Assessment   Thrombosed hemorrhoids  (primary encounter diagnosis)  This is a very nice 51-year-old gentleman who I met on 7/10/2023 when he was referred to me from his primary care physician, Dr. Eric Savage, with rectal pain secondary to a thrombosed hemorrhoid. I recommended nonoperative therapy with Anusol, sitz bath's and high-fiber diet. Patient is doing very well at this time. He is no longer having any anorectal pain. He is no longer using any topical medication. He is not having any bleeding, prolapse, seepage, itching or incontinence. He is still doing sitz bath's. Patient has no evidence of residual external hemorrhoidal disease on exam today. Plan   I recommend he continues a high-fiber diet, continues to drink plenty of water and adds a daily fiber supplement. No plans for any anorectal procedures or intervention as he is completely asymptomatic at this time. I advised him to follow-up with me as needed if he has any return of anorectal symptoms. Patient expressed understanding and is agreeable to plan. No orders of the defined types were placed in this encounter. Imaging & Referrals   None    Follow Up  No follow-ups on file.     Ariela Cabrera MD

## 2023-09-28 ENCOUNTER — TELEPHONE (OUTPATIENT)
Dept: INTERNAL MEDICINE CLINIC | Facility: CLINIC | Age: 58
End: 2023-09-28

## 2023-09-28 NOTE — TELEPHONE ENCOUNTER
Future Appointments   Date Time Provider Evert Latif   12/11/2023 11:20 AM Stiven Mai MD [facility]     Orders to edward- Pt informed that labs need to be completed no sooner than 2 weeks prior to the appt.  Pt aware to fast-no call back required

## 2023-11-01 ENCOUNTER — OFFICE VISIT (OUTPATIENT)
Facility: LOCATION | Age: 58
End: 2023-11-01
Payer: COMMERCIAL

## 2023-11-01 VITALS — HEART RATE: 76 BPM | TEMPERATURE: 98 F

## 2023-11-01 DIAGNOSIS — K82.4 GALLBLADDER POLYP: Primary | ICD-10-CM

## 2023-11-01 PROCEDURE — 99244 OFF/OP CNSLTJ NEW/EST MOD 40: CPT | Performed by: SURGERY

## 2023-11-13 ENCOUNTER — OFFICE VISIT (OUTPATIENT)
Dept: INTERNAL MEDICINE CLINIC | Facility: CLINIC | Age: 58
End: 2023-11-13
Payer: COMMERCIAL

## 2023-11-13 VITALS
TEMPERATURE: 97 F | WEIGHT: 230 LBS | HEIGHT: 75 IN | SYSTOLIC BLOOD PRESSURE: 124 MMHG | RESPIRATION RATE: 18 BRPM | DIASTOLIC BLOOD PRESSURE: 76 MMHG | HEART RATE: 98 BPM | BODY MASS INDEX: 28.6 KG/M2 | OXYGEN SATURATION: 98 %

## 2023-11-13 DIAGNOSIS — G89.29 CHRONIC PAIN OF LEFT KNEE: Primary | ICD-10-CM

## 2023-11-13 DIAGNOSIS — M25.562 CHRONIC PAIN OF LEFT KNEE: Primary | ICD-10-CM

## 2023-11-13 PROCEDURE — 3078F DIAST BP <80 MM HG: CPT | Performed by: INTERNAL MEDICINE

## 2023-11-13 PROCEDURE — 3008F BODY MASS INDEX DOCD: CPT | Performed by: INTERNAL MEDICINE

## 2023-11-13 PROCEDURE — 99213 OFFICE O/P EST LOW 20 MIN: CPT | Performed by: INTERNAL MEDICINE

## 2023-11-13 PROCEDURE — 3074F SYST BP LT 130 MM HG: CPT | Performed by: INTERNAL MEDICINE

## 2023-11-14 ENCOUNTER — HOSPITAL ENCOUNTER (OUTPATIENT)
Dept: GENERAL RADIOLOGY | Age: 58
Discharge: HOME | End: 2023-11-14
Attending: INTERNAL MEDICINE
Payer: COMMERCIAL

## 2023-11-14 DIAGNOSIS — G89.29 CHRONIC PAIN OF LEFT KNEE: ICD-10-CM

## 2023-11-14 DIAGNOSIS — M25.562 CHRONIC PAIN OF LEFT KNEE: ICD-10-CM

## 2023-11-14 PROCEDURE — 73560 X-RAY EXAM OF KNEE 1 OR 2: CPT | Performed by: INTERNAL MEDICINE

## 2023-11-22 ENCOUNTER — HOSPITAL ENCOUNTER (OUTPATIENT)
Age: 58
Discharge: HOME | End: 2023-11-22
Payer: COMMERCIAL

## 2023-11-22 VITALS
WEIGHT: 227 LBS | HEIGHT: 75 IN | RESPIRATION RATE: 16 BRPM | OXYGEN SATURATION: 99 % | DIASTOLIC BLOOD PRESSURE: 64 MMHG | SYSTOLIC BLOOD PRESSURE: 101 MMHG | HEART RATE: 90 BPM | TEMPERATURE: 98 F | BODY MASS INDEX: 28.23 KG/M2

## 2023-11-22 DIAGNOSIS — H61.23 BILATERAL IMPACTED CERUMEN: ICD-10-CM

## 2023-11-22 DIAGNOSIS — J01.00 ACUTE MAXILLARY SINUSITIS, RECURRENCE NOT SPECIFIED: Primary | ICD-10-CM

## 2023-11-22 PROCEDURE — 69210 REMOVE IMPACTED EAR WAX UNI: CPT | Performed by: NURSE PRACTITIONER

## 2023-11-22 PROCEDURE — 99213 OFFICE O/P EST LOW 20 MIN: CPT | Performed by: NURSE PRACTITIONER

## 2023-11-22 RX ORDER — DOXYCYCLINE HYCLATE 100 MG/1
100 CAPSULE ORAL 2 TIMES DAILY
Qty: 14 CAPSULE | Refills: 0 | Status: SHIPPED | OUTPATIENT
Start: 2023-11-22 | End: 2023-11-29

## 2023-11-22 NOTE — ED INITIAL ASSESSMENT (HPI)
Patient reports cough, fever for past 2 nights. States he has hx of sinus infection and bronchitis. Also reports sinus headache, nasal congestion since last night. Taking sudafed for symptoms.

## 2023-11-22 NOTE — DISCHARGE INSTRUCTIONS
Take antibiotics as directed and to completion    Supportive car measures to try at home as applicable:  Wash hands often  Disinfect your environment, linens, electronics, etc.  Drink plenty of fluids (water, Pedialyte, etc.)  Get plenty of rest and sleep with head elevated to help with sinus drainage and throat irritation  Avoid having air blow on your face as this can worsen congestion / cough  Do not share utensils or drinks  Salt water gargles throughout the day for sore throat  Cepacol lozenges as needed for sore throat  Alternate Ibuprofen (adult: 600mg) and Tylenol (adult: 650-1000mg) as needed for pain / body aches / fever  You may benefit from spoonfuls (and/or added to warm drinks) of honey throughout the day for cough  You may benefit from taking a decongestant (e.g. Sudafed - pseudoephedrine [behind the pharmacy counter])  You may benefit from using a humidifier and/or steam showers  You may benefit from Flonase nasal spray daily  You may benefit from taking a daily allergy medication (e.g. Zyrtec, Xyzal, etc.)  You may benefit from taking a daily multivitamin  You may benefit from Zinc   You may benefit from Vitamin D daily  Symptoms may take a few weeks to resolve

## 2023-11-27 ENCOUNTER — E-VISIT (OUTPATIENT)
Dept: TELEHEALTH | Age: 58
End: 2023-11-27
Payer: COMMERCIAL

## 2023-11-27 DIAGNOSIS — R05.9 COUGH, UNSPECIFIED TYPE: Primary | ICD-10-CM

## 2023-11-27 NOTE — PROGRESS NOTES
Otilia Willingham is a 66year old male. HPI:   See answers to questions above. Current Outpatient Medications   Medication Sig Dispense Refill    doxycycline 100 MG Oral Cap Take 1 capsule (100 mg total) by mouth 2 (two) times daily for 7 days. 14 capsule 0    Lidocaine 4 % External Ointment Apply 50 g topically in the morning, at noon, and at bedtime. 50 g 0    pantoprazole 40 MG Oral Tab EC Take 1 tablet (40 mg total) by mouth before breakfast.      fluticasone propionate 50 MCG/ACT Nasal Suspension by Each Nare route daily. albuterol 108 (90 Base) MCG/ACT Inhalation Aero Soln Inhale 2 puffs into the lungs every 4 (four) hours as needed. cetirizine 10 MG Oral Tab Take 1 tablet (10 mg total) by mouth daily. Past Medical History:   Diagnosis Date    Belching     Esophageal reflux     Heartburn     Indigestion     Shortness of breath     Wears glasses       Past Surgical History:   Procedure Laterality Date    COLONOSCOPY  12/2022    HERNIA SURGERY        Family History   Problem Relation Age of Onset    Cancer Father     Diabetes Mother     Diabetes Son     Dementia Maternal Grandmother       Social History:  Social History     Socioeconomic History    Marital status:    Tobacco Use    Smoking status: Never    Smokeless tobacco: Never   Substance and Sexual Activity    Alcohol use: Yes     Alcohol/week: 6.0 standard drinks of alcohol     Types: 6 Cans of beer per week    Drug use: Never   Other Topics Concern    Weight Concern Yes         ASSESSMENT AND PLAN:     Encounter Diagnosis   Name Primary? Cough, unspecified type Yes       Was seen in IC 3 days ago, started on doxy. Wants medrol dose pack RX. No wheezing currently. Discussed reaching out to PCP.     Meds & Refills for this Visit:  Requested Prescriptions      No prescriptions requested or ordered in this encounter       Duration of  the service:  8 minutes    Patient advised to follow up with PCP if no improvement or worsening of symptoms  Refer to MyChart message for specific patient instructions

## 2023-11-28 NOTE — ED NOTES
Pt called this am requesting medrol dose pack for his sinuses. Pt states the antibiotic not helping as much.   I spoke with Ansley Dyson, and he does not believe pt needs steroid,as he belives it to be viral.  Pt notified of NP's decision

## 2023-11-29 ENCOUNTER — LAB ENCOUNTER (OUTPATIENT)
Dept: LAB | Age: 58
End: 2023-11-29
Attending: INTERNAL MEDICINE
Payer: COMMERCIAL

## 2023-11-29 DIAGNOSIS — R74.01 ELEVATED ALT MEASUREMENT: ICD-10-CM

## 2023-11-29 DIAGNOSIS — R73.03 PREDIABETES: ICD-10-CM

## 2023-11-29 LAB
ALBUMIN SERPL-MCNC: 4.1 G/DL (ref 3.4–5)
ALBUMIN/GLOB SERPL: 1.2 {RATIO} (ref 1–2)
ALP LIVER SERPL-CCNC: 45 U/L
ALT SERPL-CCNC: 86 U/L
ANION GAP SERPL CALC-SCNC: 4 MMOL/L (ref 0–18)
AST SERPL-CCNC: 48 U/L (ref 15–37)
BILIRUB SERPL-MCNC: 1 MG/DL (ref 0.1–2)
BUN BLD-MCNC: 19 MG/DL (ref 9–23)
CALCIUM BLD-MCNC: 9.1 MG/DL (ref 8.5–10.1)
CHLORIDE SERPL-SCNC: 108 MMOL/L (ref 98–112)
CO2 SERPL-SCNC: 28 MMOL/L (ref 21–32)
CREAT BLD-MCNC: 0.95 MG/DL
EGFRCR SERPLBLD CKD-EPI 2021: 94 ML/MIN/1.73M2 (ref 60–?)
EST. AVERAGE GLUCOSE BLD GHB EST-MCNC: 134 MG/DL (ref 68–126)
FASTING STATUS PATIENT QL REPORTED: YES
GLOBULIN PLAS-MCNC: 3.3 G/DL (ref 2.8–4.4)
GLUCOSE BLD-MCNC: 122 MG/DL (ref 70–99)
HBA1C MFR BLD: 6.3 % (ref ?–5.7)
OSMOLALITY SERPL CALC.SUM OF ELEC: 294 MOSM/KG (ref 275–295)
POTASSIUM SERPL-SCNC: 4 MMOL/L (ref 3.5–5.1)
PROT SERPL-MCNC: 7.4 G/DL (ref 6.4–8.2)
SODIUM SERPL-SCNC: 140 MMOL/L (ref 136–145)

## 2023-11-29 PROCEDURE — 83036 HEMOGLOBIN GLYCOSYLATED A1C: CPT | Performed by: INTERNAL MEDICINE

## 2023-11-29 PROCEDURE — 80053 COMPREHEN METABOLIC PANEL: CPT | Performed by: INTERNAL MEDICINE

## 2023-12-08 ENCOUNTER — TELEPHONE (OUTPATIENT)
Dept: PHYSICAL THERAPY | Facility: HOSPITAL | Age: 58
End: 2023-12-08

## 2023-12-10 ENCOUNTER — WALK IN (OUTPATIENT)
Dept: URGENT CARE | Age: 58
End: 2023-12-10

## 2023-12-10 VITALS
SYSTOLIC BLOOD PRESSURE: 109 MMHG | DIASTOLIC BLOOD PRESSURE: 75 MMHG | TEMPERATURE: 97.4 F | OXYGEN SATURATION: 97 % | RESPIRATION RATE: 20 BRPM | HEART RATE: 102 BPM

## 2023-12-10 DIAGNOSIS — J32.9 SINUSITIS, UNSPECIFIED CHRONICITY, UNSPECIFIED LOCATION: ICD-10-CM

## 2023-12-10 DIAGNOSIS — J40 BRONCHITIS: ICD-10-CM

## 2023-12-10 DIAGNOSIS — R50.9 FEVER WITH CHILLS: Primary | ICD-10-CM

## 2023-12-10 LAB
FLUAV AG UPPER RESP QL IA.RAPID: NEGATIVE
FLUBV AG UPPER RESP QL IA.RAPID: NEGATIVE
SARS-COV+SARS-COV-2 AG RESP QL IA.RAPID: NOT DETECTED
TEST LOT EXPIRATION DATE: NORMAL
TEST LOT NUMBER: NORMAL

## 2023-12-10 PROCEDURE — 99214 OFFICE O/P EST MOD 30 MIN: CPT | Performed by: FAMILY MEDICINE

## 2023-12-10 PROCEDURE — 87428 SARSCOV & INF VIR A&B AG IA: CPT | Performed by: FAMILY MEDICINE

## 2023-12-10 RX ORDER — ALBUTEROL SULFATE 2.5 MG/3ML
2.5 SOLUTION RESPIRATORY (INHALATION) EVERY 6 HOURS PRN
Qty: 75 ML | Refills: 0 | Status: SHIPPED | OUTPATIENT
Start: 2023-12-10

## 2023-12-10 RX ORDER — DOXYCYCLINE HYCLATE 100 MG
100 TABLET ORAL 2 TIMES DAILY
Qty: 20 TABLET | Refills: 0 | Status: SHIPPED | OUTPATIENT
Start: 2023-12-10 | End: 2023-12-20

## 2023-12-10 RX ORDER — ALBUTEROL SULFATE 90 UG/1
AEROSOL, METERED RESPIRATORY (INHALATION)
Qty: 1 EACH | Refills: 0 | Status: SHIPPED | OUTPATIENT
Start: 2023-12-10

## 2023-12-10 RX ORDER — PREDNISONE 20 MG/1
40 TABLET ORAL DAILY
Qty: 10 TABLET | Refills: 0 | Status: SHIPPED | OUTPATIENT
Start: 2023-12-10 | End: 2023-12-15

## 2023-12-10 RX ORDER — DOXYCYCLINE HYCLATE 100 MG/1
CAPSULE ORAL
COMMUNITY
Start: 2023-11-22

## 2023-12-10 RX ORDER — PANTOPRAZOLE SODIUM 40 MG/1
40 TABLET, DELAYED RELEASE ORAL
COMMUNITY
Start: 2023-06-23

## 2023-12-10 ASSESSMENT — ENCOUNTER SYMPTOMS: COUGH: 1

## 2023-12-13 ENCOUNTER — OFFICE VISIT (OUTPATIENT)
Dept: PHYSICAL THERAPY | Age: 58
End: 2023-12-13
Attending: INTERNAL MEDICINE
Payer: COMMERCIAL

## 2023-12-13 DIAGNOSIS — M25.562 CHRONIC PAIN OF LEFT KNEE: Primary | ICD-10-CM

## 2023-12-13 DIAGNOSIS — G89.29 CHRONIC PAIN OF LEFT KNEE: Primary | ICD-10-CM

## 2023-12-13 PROCEDURE — 97110 THERAPEUTIC EXERCISES: CPT

## 2023-12-13 PROCEDURE — 97161 PT EVAL LOW COMPLEX 20 MIN: CPT

## 2023-12-13 NOTE — PATIENT INSTRUCTIONS
Access Code: [email or web address]  URL: [email or web address]  Date: 12/13/2023  Prepared by: Raymond Monroe    Exercises  - Supine Hamstring Stretch with Strap  - 2 x daily - 7 x weekly - 1 sets - 3 reps - 30 sec hold  - Supine Piriformis Stretch with Foot on Ground  - 2 x daily - 7 x weekly - 1 sets - 3 reps - 15-20 sec hold  - Hip Flexor Stretch with Chair  - 2 x daily - 7 x weekly - 1 sets - 3 reps - 20 sec hold  - Roller Massage Elongated IT Band Release  - 2 x daily - 7 x weekly - 1 sets - 40-50 reps  - Standing Hip Abduction with Resistance at Ankles and Counter Support  - 2 x daily - 7 x weekly - 1 sets - 10 reps - 2 sec hold

## 2023-12-14 ENCOUNTER — OFFICE VISIT (OUTPATIENT)
Dept: PHYSICAL THERAPY | Age: 58
End: 2023-12-14
Attending: INTERNAL MEDICINE
Payer: COMMERCIAL

## 2023-12-14 PROCEDURE — 97110 THERAPEUTIC EXERCISES: CPT

## 2023-12-14 NOTE — PROGRESS NOTES
Diagnosis:   Chronic pain of left knee (Y49.919,C81.75)       Referring Provider: Anthony Hirsch  Date of Evaluation:    12/13/2023    Precautions:  None Next MD visit:   none scheduled  Date of Surgery: n/a   Insurance Primary/Secondary: N/A / N/A     # Auth Visits: 10 recommended            Subjective: no new problems. Reports that he did work on HEP last night and felt better after doing them. Had a little knee pain on the drive to appt today but no pain right now. Pain: 0/10      Objective:     Flexibility:  Hip Flexor: R Moderate restrictions, L Moderate restrictions  Hamstrings: R 70; L 70  Piriformis: R Moderate restrictions; L Moderate restrictions  Quads: R/L mild restrictions;  ITB: R/L: Moderate restrictions     Strength/MMT: (* denotes performed with pain)  Hip Knee Foot/Ankle   Flexion: R 5/5; L 5/5  Abduction: R 4/5; L 4/5  ER: R 4/5; L 4/5  IR: R 5/5; L 5/5 Flexion: R 5/5; L 5/5  Extension: R 5/5; L 5/5    DF: R 5/5; L 5/5  PF: R 55/5; L 5/5  INV: R 5/5; L 5/5  EV: R 5/5; L 5/5         Assessment: completed todays treatment without c/o increased pain or discomfort following treatment. Required verbal and tactile cues for proper performance of piriformis, hip flexor and hamstring stretches. Able to perform correctly with cues. Tolerated all exercises without increased knee pain. Denies pain post treatment. Goals:    (to be met in 10 visits)  Pt with demonstrate hamstring flexibility >75 deg bilaterally   Improve L/R hip flexor flexibility to minimal restrictions. Pt will increase functional hip and knee strength to grossly 5/5 to be able to squat to the floor pain free  Pt will demonstrate increased hip ABD/ER strength to 4+/5 to perform stepping and squatting activities with minimal pain  Pt will report ability to sit with knee bent and drive 1 hour with minimal to no knee pain   Pt will be independent and compliant with comprehensive HEP to maintain progress achieved in PT    Plan: continue POC. Progress as tolerated. Date: 12/14/2023  TX#: 2/10 Date:                 TX#: 3/ Date:                 TX#: 4/ Date:                 TX#: 5/ Date: Tx#: 6/   Elliptical L1 x5 min       Bilat gastroc stretches 3x30 sec       RTB lateral and monster band walking 35ft x 2 ea       Reformer leg press 5 bands double knee ext 3x20 reps  R/L SL 4 bands 3x10 ea       L/R hip flexor stretch at chair 3x20 sec       Supine: L/R piriformis stretch 3x15 sec       Supine: L/R piriformis stretch 3x15 sec       Hkly: bridges 15 x5 sec       Supine: R/L hamstring stretch with strap 3x 30 sec ea        R s/l: ITB STM with muscle stick x5 min       HEP: Access Code: [email or web address]  URL: [email or web address]  Date: 12/13/2023  Prepared by: Alannah Lafleur     Exercises  - Supine Hamstring Stretch with Strap  - 2 x daily - 7 x weekly - 1 sets - 3 reps - 30 sec hold  - Supine Piriformis Stretch with Foot on Ground  - 2 x daily - 7 x weekly - 1 sets - 3 reps - 15-20 sec hold  - Hip Flexor Stretch with Chair  - 2 x daily - 7 x weekly - 1 sets - 3 reps - 20 sec hold  - Roller Massage Elongated IT Band Release  - 2 x daily - 7 x weekly - 1 sets - 40-50 reps  - Standing Hip Abduction with Resistance at Ankles and Counter Support  - 2 x daily - 7 x weekly - 1 sets - 10 reps - 2 sec hold    Charges: Ex 3 (45)       Total Timed Treatment:  45 min  Total Treatment Time: 45 min

## 2023-12-20 ENCOUNTER — OFFICE VISIT (OUTPATIENT)
Dept: PHYSICAL THERAPY | Age: 58
End: 2023-12-20
Attending: INTERNAL MEDICINE
Payer: COMMERCIAL

## 2023-12-20 PROCEDURE — 97110 THERAPEUTIC EXERCISES: CPT

## 2023-12-20 NOTE — PROGRESS NOTES
Diagnosis:   Chronic pain of left knee (F33.613,K82.34)       Referring Provider: Dylan Hillman  Date of Evaluation:    12/13/2023    Precautions:  None Next MD visit:   none scheduled  Date of Surgery: n/a   Insurance Primary/Secondary: N/A / N/A     # Auth Visits: 10 recommended            Subjective: no new problems. Reports working on HEP 1x/day but has not noticed much change in his pain. States sitting does feel better if he rolls the ITB right before sitting. Pain: 0/10      Objective:     Flexibility:  Hip Flexor: R Moderate restrictions, L Moderate restrictions  Hamstrings: R 70; L 70  Piriformis: R Moderate restrictions; L Moderate restrictions  Quads: R/L mild restrictions;  ITB: R/L: Moderate restrictions     Strength/MMT: (* denotes performed with pain)  Hip Knee Foot/Ankle   Flexion: R 5/5; L 5/5  Abduction: R 4/5; L 4/5  ER: R 4/5; L 4/5  IR: R 5/5; L 5/5 Flexion: R 5/5; L 5/5  Extension: R 5/5; L 5/5    DF: R 5/5; L 5/5  PF: R 55/5; L 5/5  INV: R 5/5; L 5/5  EV: R 5/5; L 5/5         Assessment: completed todays treatment without c/o increased pain or discomfort following treatment. Reviewed HEP and pt performs correctly today. Encouraged pt to complete HEP 2-3x/day for optimal outcomes. Educated on role of hip strengthening for ITB flexibility. PT states undsertanding. Advised pt to try to roll ITB prior to long hours of sitting to help with pain and to try to roll ITB frequently through the day if sitting for long periods of time. PT states understanding. Added GTB clamshells to HEP. Goals:    (to be met in 10 visits)  Pt with demonstrate hamstring flexibility >75 deg bilaterally   Improve L/R hip flexor flexibility to minimal restrictions.   Pt will increase functional hip and knee strength to grossly 5/5 to be able to squat to the floor pain free  Pt will demonstrate increased hip ABD/ER strength to 4+/5 to perform stepping and squatting activities with minimal pain  Pt will report ability to sit with knee bent and drive 1 hour with minimal to no knee pain   Pt will be independent and compliant with comprehensive HEP to maintain progress achieved in PT    Plan: continue POC. Progress as tolerated. Date: 12/14/2023  TX#: 2/10 Date: 12/20/2023               TX#: 3/10 Date:                 TX#: 4/ Date:                 TX#: 5/ Date: Tx#: 6/   Elliptical L1 x5 min Elliptical L1 x6 min      Bilat gastroc stretches 3x30 sec Bilat gastroc stretches 3x30 sec      RTB lateral and monster band walking 35ft x 2 ea RTB lateral and monster band walking 35ft x 3 ea      Reformer leg press 5 bands double knee ext 3x20 reps  R/L SL 4 bands 3x10 ea Reformer leg press 5 bands double knee ext 3x20 reps  R/L SL 5 bands 3x10 ea      L/R hip flexor stretch at chair 3x20 sec L/R hip flexor stretch at chair 3x20 sec      Supine: L/R piriformis stretch 3x15 sec Supine: L/R piriformis stretch 3x15 sec      Supine: L/R piriformis stretch 3x15 sec L/R clamshells GTB x10 ea      Hkly: bridges 15 x5 sec Hkly: bridges 20 x5 sec      Supine: R/L hamstring stretch with strap 3x 30 sec ea  --      R s/l: ITB STM with muscle stick x5 min R s/l: ITB STM with muscle stick x5 min      HEP: Access Code: [email or web address]  URL: [email or web address]  Date: 12/13/2023  Prepared by: Payam Patrick     Exercises  - Supine Hamstring Stretch with Strap  - 2 x daily - 7 x weekly - 1 sets - 3 reps - 30 sec hold  - Supine Piriformis Stretch with Foot on Ground  - 2 x daily - 7 x weekly - 1 sets - 3 reps - 15-20 sec hold  - Hip Flexor Stretch with Chair  - 2 x daily - 7 x weekly - 1 sets - 3 reps - 20 sec hold  - Roller Massage Elongated IT Band Release  - 2 x daily - 7 x weekly - 1 sets - 40-50 reps  - Standing Hip Abduction with Resistance at Ankles and Counter Support  - 2 x daily - 7 x weekly - 1 sets - 10 reps - 2 sec hold  12/20/2023  Clamshells GTB x10    Charges: Ex 3 (45)       Total Timed Treatment:  45 min  Total Treatment Time: 45 min

## 2023-12-21 NOTE — PROGRESS NOTES
Diagnosis:   Chronic pain of left knee (L08.731,K22.01)       Referring Provider: Jakob Lawrence  Date of Evaluation:    12/13/2023    Precautions:  None Next MD visit:   none scheduled  Date of Surgery: n/a   Insurance Primary/Secondary: N/A / N/A     # Auth Visits: 10 recommended            Subjective: Knee pain in the front, a lot when sitting. Long drive downtown yesterday and feels it was better, not great. Pain: 0/10      Objective:     12/22/2023  Prone knee flexion: R 126; L 118   Bill test: B restrictions, L slight more quad, R more psoas  Hip screen: B hip extension limits with stretching felt anterior  Accessory motion: limited s/I at patella L more limited    Flexibility:  Hip Flexor: R Moderate restrictions, L Moderate restrictions  Hamstrings: R 70; L 70  Piriformis: R Moderate restrictions; L Moderate restrictions  Quads: R/L mild restrictions;  ITB: R/L: Moderate restrictions     Strength/MMT: (* denotes performed with pain)  Hip Knee Foot/Ankle   Flexion: R 5/5; L 5/5  Abduction: R 4/5; L 4/5  ER: R 4/5; L 4/5  IR: R 5/5; L 5/5 Flexion: R 5/5; L 5/5  Extension: R 5/5; L 5/5    DF: R 5/5; L 5/5  PF: R 55/5; L 5/5  INV: R 5/5; L 5/5  EV: R 5/5; L 5/5         Assessment: Notable reduced patellar mobility into superior glide on L compared to R as well as mild quad tightness more prominent here as well. Added stretching and manual glides to address these. Continued with focus on gluteal strengthening. All with good tolerance without reported pain    Goals:    (to be met in 10 visits)  Pt with demonstrate hamstring flexibility >75 deg bilaterally   Improve L/R hip flexor flexibility to minimal restrictions.   Pt will increase functional hip and knee strength to grossly 5/5 to be able to squat to the floor pain free  Pt will demonstrate increased hip ABD/ER strength to 4+/5 to perform stepping and squatting activities with minimal pain  Pt will report ability to sit with knee bent and drive 1 hour with minimal to no knee pain   Pt will be independent and compliant with comprehensive HEP to maintain progress achieved in PT    Plan: continue POC. Progress as tolerated. Date: 12/14/2023  TX#: 2/10 Date: 12/20/2023               TX#: 3/10 Date:          12/22/2023       TX#: 4/ Date:                 TX#: 5/ Date: Tx#: 6/   Elliptical L1 x5 min Elliptical L1 x6 min Elliptical L1 x6 min  Brief re-assessment-new to this therapist     Bilat gastroc stretches 3x30 sec Bilat gastroc stretches 3x30 sec Strap quad stretch 3 x 20s      RTB lateral and monster band walking 35ft x 2 ea RTB lateral and monster band walking 35ft x 3 ea RTB lateral and monster band walking , forward and retro 35ft x 3 ea     Reformer leg press 5 bands double knee ext 3x20 reps  R/L SL 4 bands 3x10 ea Reformer leg press 5 bands double knee ext 3x20 reps  R/L SL 5 bands 3x10 ea Reformer leg press 5 bands double knee ext 3x20 reps  R/L SL 5 bands 3x10 ea     L/R hip flexor stretch at chair 3x20 sec L/R hip flexor stretch at chair 3x20 sec L/R hip flexor stretch at chair 3x20 sec     Supine: L/R piriformis stretch 3x15 sec Supine: L/R piriformis stretch 3x15 sec      Supine: L/R piriformis stretch 3x15 sec L/R clamshells GTB x10 ea      Hkly: bridges 15 x5 sec Hkly: bridges 20 x5 sec Bridge with band abduction GTB abd bias, 2 x 12, 2s hold      Supine: R/L hamstring stretch with strap 3x 30 sec ea  --      R s/l: ITB STM with muscle stick x5 min R s/l: ITB STM with muscle stick x5 min Patellar glides G3 superior and medial  in varying degrees of flexion on L x 8'      HEP: Access Code: [email or web address]  URL: [email or web address]/  Date: 12/13/2023  Prepared by: Rao Demarco     Exercises  - Supine Hamstring Stretch with Strap  - 2 x daily - 7 x weekly - 1 sets - 3 reps - 30 sec hold  - Supine Piriformis Stretch with Foot on Ground  - 2 x daily - 7 x weekly - 1 sets - 3 reps - 15-20 sec hold  - Hip Flexor Stretch with Chair  - 2 x daily - 7 x weekly - 1 sets - 3 reps - 20 sec hold  - Roller Massage Elongated IT Band Release  - 2 x daily - 7 x weekly - 1 sets - 40-50 reps  - Standing Hip Abduction with Resistance at Ankles and Counter Support  - 2 x daily - 7 x weekly - 1 sets - 10 reps - 2 sec hold  12/20/2023  Telma GTB x10  RF stretch     Charges: Ex 2(35'); manual x 1 (8')      Total Timed Treatment:  43 min  Total Treatment Time: 43 min

## 2023-12-22 ENCOUNTER — OFFICE VISIT (OUTPATIENT)
Dept: PHYSICAL THERAPY | Age: 58
End: 2023-12-22
Attending: INTERNAL MEDICINE
Payer: COMMERCIAL

## 2023-12-22 PROCEDURE — 97110 THERAPEUTIC EXERCISES: CPT

## 2023-12-22 PROCEDURE — 97140 MANUAL THERAPY 1/> REGIONS: CPT

## 2023-12-26 ENCOUNTER — APPOINTMENT (OUTPATIENT)
Dept: PHYSICAL THERAPY | Age: 58
End: 2023-12-26
Attending: INTERNAL MEDICINE
Payer: COMMERCIAL

## 2023-12-29 ENCOUNTER — APPOINTMENT (OUTPATIENT)
Dept: PHYSICAL THERAPY | Age: 58
End: 2023-12-29
Attending: INTERNAL MEDICINE
Payer: COMMERCIAL

## 2024-01-04 ENCOUNTER — TELEPHONE (OUTPATIENT)
Dept: INTERNAL MEDICINE CLINIC | Facility: CLINIC | Age: 59
End: 2024-01-04

## 2024-01-04 DIAGNOSIS — Z12.5 SCREENING FOR PROSTATE CANCER: ICD-10-CM

## 2024-01-04 DIAGNOSIS — Z13.0 SCREENING FOR BLOOD DISEASE: ICD-10-CM

## 2024-01-04 DIAGNOSIS — Z00.00 ROUTINE GENERAL MEDICAL EXAMINATION AT A HEALTH CARE FACILITY: Primary | ICD-10-CM

## 2024-01-04 DIAGNOSIS — Z13.220 SCREENING FOR LIPID DISORDERS: ICD-10-CM

## 2024-01-04 DIAGNOSIS — Z13.228 SCREENING FOR METABOLIC DISORDER: ICD-10-CM

## 2024-01-04 DIAGNOSIS — Z13.29 SCREENING FOR THYROID DISORDER: ICD-10-CM

## 2024-01-05 ENCOUNTER — APPOINTMENT (OUTPATIENT)
Dept: PHYSICAL THERAPY | Age: 59
End: 2024-01-05
Attending: INTERNAL MEDICINE
Payer: COMMERCIAL

## 2024-01-12 ENCOUNTER — APPOINTMENT (OUTPATIENT)
Dept: PHYSICAL THERAPY | Age: 59
End: 2024-01-12
Attending: INTERNAL MEDICINE
Payer: COMMERCIAL

## 2024-01-19 ENCOUNTER — HOSPITAL ENCOUNTER (EMERGENCY)
Facility: HOSPITAL | Age: 59
Discharge: HOME | End: 2024-01-19
Attending: EMERGENCY MEDICINE
Payer: COMMERCIAL

## 2024-01-19 ENCOUNTER — APPOINTMENT (OUTPATIENT)
Dept: GENERAL RADIOLOGY | Facility: HOSPITAL | Age: 59
End: 2024-01-19
Payer: COMMERCIAL

## 2024-01-19 ENCOUNTER — OFFICE VISIT (OUTPATIENT)
Dept: INTERNAL MEDICINE CLINIC | Facility: CLINIC | Age: 59
End: 2024-01-19
Payer: COMMERCIAL

## 2024-01-19 ENCOUNTER — APPOINTMENT (OUTPATIENT)
Dept: CT IMAGING | Facility: HOSPITAL | Age: 59
End: 2024-01-19
Attending: EMERGENCY MEDICINE
Payer: COMMERCIAL

## 2024-01-19 ENCOUNTER — APPOINTMENT (OUTPATIENT)
Dept: CV DIAGNOSTICS | Facility: HOSPITAL | Age: 59
End: 2024-01-19
Attending: EMERGENCY MEDICINE
Payer: COMMERCIAL

## 2024-01-19 VITALS
SYSTOLIC BLOOD PRESSURE: 126 MMHG | WEIGHT: 233.38 LBS | HEART RATE: 94 BPM | TEMPERATURE: 97 F | HEIGHT: 76 IN | DIASTOLIC BLOOD PRESSURE: 74 MMHG | RESPIRATION RATE: 18 BRPM | OXYGEN SATURATION: 99 % | BODY MASS INDEX: 28.42 KG/M2

## 2024-01-19 VITALS
OXYGEN SATURATION: 97 % | RESPIRATION RATE: 17 BRPM | DIASTOLIC BLOOD PRESSURE: 96 MMHG | WEIGHT: 225 LBS | BODY MASS INDEX: 27.98 KG/M2 | SYSTOLIC BLOOD PRESSURE: 128 MMHG | HEART RATE: 84 BPM | HEIGHT: 75 IN | TEMPERATURE: 98 F

## 2024-01-19 DIAGNOSIS — I48.92 NEW ONSET ATRIAL FLUTTER (HCC): Primary | ICD-10-CM

## 2024-01-19 DIAGNOSIS — K22.2 ESOPHAGEAL STRICTURE: ICD-10-CM

## 2024-01-19 DIAGNOSIS — K20.80 CORROSIVE ESOPHAGITIS: ICD-10-CM

## 2024-01-19 DIAGNOSIS — M25.562 CHRONIC PAIN OF LEFT KNEE: ICD-10-CM

## 2024-01-19 DIAGNOSIS — I48.92 ATRIAL FLUTTER, UNSPECIFIED TYPE (HCC): ICD-10-CM

## 2024-01-19 DIAGNOSIS — M17.12 PRIMARY OSTEOARTHRITIS OF LEFT KNEE: ICD-10-CM

## 2024-01-19 DIAGNOSIS — R01.2 ABNORMAL HEART SOUNDS: ICD-10-CM

## 2024-01-19 DIAGNOSIS — R73.03 PREDIABETES: ICD-10-CM

## 2024-01-19 DIAGNOSIS — Z00.00 ROUTINE GENERAL MEDICAL EXAMINATION AT A HEALTH CARE FACILITY: Primary | ICD-10-CM

## 2024-01-19 DIAGNOSIS — K76.0 NAFLD (NONALCOHOLIC FATTY LIVER DISEASE): ICD-10-CM

## 2024-01-19 DIAGNOSIS — K64.4 EXTERNAL HEMORRHOID: ICD-10-CM

## 2024-01-19 DIAGNOSIS — K21.00 GASTROESOPHAGEAL REFLUX DISEASE WITH ESOPHAGITIS WITHOUT HEMORRHAGE: ICD-10-CM

## 2024-01-19 DIAGNOSIS — G89.29 CHRONIC PAIN OF LEFT KNEE: ICD-10-CM

## 2024-01-19 PROBLEM — R73.9 HYPERGLYCEMIA: Status: ACTIVE | Noted: 2024-01-19

## 2024-01-19 LAB
ALBUMIN SERPL-MCNC: 4 G/DL (ref 3.4–5)
ALBUMIN/GLOB SERPL: 1.2 {RATIO} (ref 1–2)
ALP LIVER SERPL-CCNC: 43 U/L
ANION GAP SERPL CALC-SCNC: 4 MMOL/L (ref 0–18)
APTT PPP: 27.9 SECONDS (ref 23.3–35.6)
AST SERPL-CCNC: 26 U/L (ref 15–37)
ATRIAL RATE: 374 BPM
BASOPHILS # BLD AUTO: 0.04 X10(3) UL (ref 0–0.2)
BASOPHILS NFR BLD AUTO: 0.5 %
BILIRUB SERPL-MCNC: 0.6 MG/DL (ref 0.1–2)
BUN BLD-MCNC: 14 MG/DL (ref 9–23)
CALCIUM BLD-MCNC: 8.8 MG/DL (ref 8.5–10.1)
CHLORIDE SERPL-SCNC: 109 MMOL/L (ref 98–112)
CO2 SERPL-SCNC: 28 MMOL/L (ref 21–32)
CREAT BLD-MCNC: 0.79 MG/DL
EGFRCR SERPLBLD CKD-EPI 2021: 104 ML/MIN/1.73M2 (ref 60–?)
EOSINOPHIL # BLD AUTO: 0.34 X10(3) UL (ref 0–0.7)
EOSINOPHIL NFR BLD AUTO: 3.8 %
ERYTHROCYTE [DISTWIDTH] IN BLOOD BY AUTOMATED COUNT: 13.7 %
GLOBULIN PLAS-MCNC: 3.3 G/DL (ref 2.8–4.4)
GLUCOSE BLD-MCNC: 105 MG/DL (ref 70–99)
HCT VFR BLD AUTO: 44.3 %
HGB BLD-MCNC: 15.2 G/DL
IMM GRANULOCYTES # BLD AUTO: 0.03 X10(3) UL (ref 0–1)
IMM GRANULOCYTES NFR BLD: 0.3 %
LYMPHOCYTES # BLD AUTO: 2.2 X10(3) UL (ref 1–4)
LYMPHOCYTES NFR BLD AUTO: 24.9 %
MCH RBC QN AUTO: 28.6 PG (ref 26–34)
MCHC RBC AUTO-ENTMCNC: 34.3 G/DL (ref 31–37)
MCV RBC AUTO: 83.3 FL
MONOCYTES # BLD AUTO: 0.75 X10(3) UL (ref 0.1–1)
MONOCYTES NFR BLD AUTO: 8.5 %
NEUTROPHILS # BLD AUTO: 5.48 X10 (3) UL (ref 1.5–7.7)
NEUTROPHILS # BLD AUTO: 5.48 X10(3) UL (ref 1.5–7.7)
NEUTROPHILS NFR BLD AUTO: 62 %
OSMOLALITY SERPL CALC.SUM OF ELEC: 293 MOSM/KG (ref 275–295)
PLATELET # BLD AUTO: 244 10(3)UL (ref 150–450)
POTASSIUM SERPL-SCNC: 4 MMOL/L (ref 3.5–5.1)
PROT SERPL-MCNC: 7.3 G/DL (ref 6.4–8.2)
Q-T INTERVAL: 328 MS
Q-T INTERVAL: 332 MS
QRS DURATION: 74 MS
QRS DURATION: 74 MS
QTC CALCULATION (BEZET): 426 MS
QTC CALCULATION (BEZET): 429 MS
R AXIS: 21 DEGREES
R AXIS: 27 DEGREES
RBC # BLD AUTO: 5.32 X10(6)UL
SODIUM SERPL-SCNC: 141 MMOL/L (ref 136–145)
T AXIS: 26 DEGREES
T AXIS: 47 DEGREES
TROPONIN I SERPL HS-MCNC: 9 NG/L
TSI SER-ACNC: 1.48 MIU/ML (ref 0.36–3.74)
VENTRICULAR RATE: 103 BPM
VENTRICULAR RATE: 99 BPM
WBC # BLD AUTO: 8.8 X10(3) UL (ref 4–11)

## 2024-01-19 PROCEDURE — 84484 ASSAY OF TROPONIN QUANT: CPT | Performed by: EMERGENCY MEDICINE

## 2024-01-19 PROCEDURE — 36415 COLL VENOUS BLD VENIPUNCTURE: CPT

## 2024-01-19 PROCEDURE — 93010 ELECTROCARDIOGRAM REPORT: CPT

## 2024-01-19 PROCEDURE — 96372 THER/PROPH/DIAG INJ SC/IM: CPT

## 2024-01-19 PROCEDURE — 71275 CT ANGIOGRAPHY CHEST: CPT | Performed by: EMERGENCY MEDICINE

## 2024-01-19 PROCEDURE — 93306 TTE W/DOPPLER COMPLETE: CPT | Performed by: EMERGENCY MEDICINE

## 2024-01-19 PROCEDURE — 71046 X-RAY EXAM CHEST 2 VIEWS: CPT

## 2024-01-19 PROCEDURE — 99285 EMERGENCY DEPT VISIT HI MDM: CPT

## 2024-01-19 PROCEDURE — 84443 ASSAY THYROID STIM HORMONE: CPT | Performed by: EMERGENCY MEDICINE

## 2024-01-19 PROCEDURE — 93005 ELECTROCARDIOGRAM TRACING: CPT

## 2024-01-19 PROCEDURE — 84484 ASSAY OF TROPONIN QUANT: CPT

## 2024-01-19 PROCEDURE — 85730 THROMBOPLASTIN TIME PARTIAL: CPT | Performed by: EMERGENCY MEDICINE

## 2024-01-19 PROCEDURE — 80053 COMPREHEN METABOLIC PANEL: CPT

## 2024-01-19 PROCEDURE — 85025 COMPLETE CBC W/AUTO DIFF WBC: CPT

## 2024-01-19 PROCEDURE — 85025 COMPLETE CBC W/AUTO DIFF WBC: CPT | Performed by: EMERGENCY MEDICINE

## 2024-01-19 PROCEDURE — 80053 COMPREHEN METABOLIC PANEL: CPT | Performed by: EMERGENCY MEDICINE

## 2024-01-19 RX ORDER — DILTIAZEM HYDROCHLORIDE 120 MG/1
120 CAPSULE, COATED, EXTENDED RELEASE ORAL DAILY
Qty: 30 CAPSULE | Refills: 0 | Status: SHIPPED | OUTPATIENT
Start: 2024-01-19

## 2024-01-19 RX ORDER — IOHEXOL 350 MG/ML
100 INJECTION, SOLUTION INTRAVENOUS
Status: COMPLETED | OUTPATIENT
Start: 2024-01-19 | End: 2024-01-19

## 2024-01-19 RX ORDER — FLUTICASONE PROPIONATE 110 UG/1
2 AEROSOL, METERED RESPIRATORY (INHALATION) 2 TIMES DAILY
Qty: 12 G | Refills: 0 | Status: SHIPPED | OUTPATIENT
Start: 2024-01-19

## 2024-01-19 RX ORDER — ENOXAPARIN SODIUM 100 MG/ML
100 INJECTION SUBCUTANEOUS ONCE
Status: COMPLETED | OUTPATIENT
Start: 2024-01-19 | End: 2024-01-19

## 2024-01-19 NOTE — PROGRESS NOTES
Miguel Angel Guajardo  6/11/1965    Chief Complaint   Patient presents with    Physical     Rm 7        HPI:   Miguel Angel Guajardo is a 58 year old male who presents for an annual physical examination.    The patient reports a near two-month duration of a shortness of breath with minimal exertion, which is atypical for him. Symptom-onset was immediately following an upper respiratory infection. His symptoms were managed since with two courses of oral steroid and antibiotic therapies, and inhaled and nebulized elidia therapies offering short durations of relief. No current fevers or chills. No nasal or sinus congestion. No chest pain or lightheadedness.     No further acute concerns.    Current Outpatient Medications   Medication Sig Dispense Refill    Lidocaine 4 % External Ointment Apply 50 g topically in the morning, at noon, and at bedtime. (Patient taking differently: Apply 50 g topically as needed.) 50 g 0    pantoprazole 40 MG Oral Tab EC Take 1 tablet (40 mg total) by mouth before breakfast.      fluticasone propionate 50 MCG/ACT Nasal Suspension by Each Nare route daily.      albuterol 108 (90 Base) MCG/ACT Inhalation Aero Soln Inhale 2 puffs into the lungs every 4 (four) hours as needed.      cetirizine 10 MG Oral Tab Take 1 tablet (10 mg total) by mouth daily.        Allergies   Allergen Reactions    Penicillins OTHER (SEE COMMENTS) and ANAPHYLAXIS     Convulsions      Past Medical History:   Diagnosis Date    Belching     Esophageal reflux     Heartburn     Indigestion     Shortness of breath     Wears glasses       Patient Active Problem List   Diagnosis    Coccydynia    Gastroesophageal reflux disease with esophagitis without hemorrhage    Seasonal allergies    Heartburn    Esophagitis    Esophageal stricture    Special screening for malignant neoplasm of colon    Thrombosed hemorrhoids    Gallbladder polyp      Past Surgical History:   Procedure Laterality Date    COLONOSCOPY  12/2022    HERNIA SURGERY         Family History   Problem Relation Age of Onset    Cancer Father     Diabetes Mother     Diabetes Son     Dementia Maternal Grandmother       Social History     Socioeconomic History    Marital status:    Tobacco Use    Smoking status: Never    Smokeless tobacco: Never   Vaping Use    Vaping Use: Never used   Substance and Sexual Activity    Alcohol use: Yes     Alcohol/week: 6.0 standard drinks of alcohol     Types: 6 Cans of beer per week    Drug use: Never   Other Topics Concern    Weight Concern Yes         REVIEW OF SYSTEMS:   GENERAL: feels well otherwise  SKIN: no rashes  EYES:denies blurred vision or double vision  HEENT: not congested  LUNGS: shortness of breath with exertion  CARDIOVASCULAR: denies chest pain on exertion  GI: no nausea or abdominal pain  NEURO: denies headaches    EXAM:   /74 (BP Location: Left arm, Patient Position: Sitting, Cuff Size: adult)   Pulse 94   Temp 96.6 °F (35.9 °C) (Skin)   Resp 18   Ht 6' 4\" (1.93 m)   Wt 233 lb 6.4 oz (105.9 kg)   SpO2 99%   BMI 28.41 kg/m²   GENERAL: Well developed, well nourished,in no apparent distress  SKIN: No rashes,no suspicious lesions  EYES: bilateral conjunctiva are clear  HEENT: atraumatic, normocephalic. TM WNL BL.  NECK: supple,no adenopathy,no bruits  LUNGS: clear to auscultation  CARDIO: Regular rate with irregular rhythm, without murmur  GI: good BS's,no masses, HSM or tenderness    ASSESSMENT AND PLAN:   Miguel Angel Guajardo is a 58 year old male who presents for an annual physical examination.    Outstanding screening and preventive measures:  Shingles and tetanus immunizations: declined today    Abnormal ECG:  Atrial flutter with variable AV block, rate of 103 bpm, with qrs of 74 ms, qtc of 429 ms  [id number] of 0  Symptomatic: ~2 mo duration of shortness of breath  Sent to ED for prompt evaluation and management    Chronic left knee pain:  Post PT x four sessions without improvement  Xray with mild primary  OA  Referred to ortho service for further evaluation    External hemorrhoids:  Post thrombosis  Following with general surgery service    Prediabetes:  Stable with HbA1c of 6.3%  Dietary and lifestyle management reinforced at length    GERD:  Complicated by esophagitis and esophageal stricture  Stable and controlled with use of pantoprazole 40 mg daily  Following with GI service    NAFLD:  Stable transaminitis  US pending  Following with GI service    The patient indicates understanding of these issues and agrees to the plan.  TODAY'S ORDERS     No orders of the defined types were placed in this encounter.      Meds & Refills:  Requested Prescriptions      No prescriptions requested or ordered in this encounter       Imaging & Consults:  ELECTROCARDIOGRAM, COMPLETE  ORTHOPEDIC - INTERNAL    No follow-ups on file.  There are no Patient Instructions on file for this visit.    All questions were answered and the patient agrees with the plan.     Thank you,  Stiven Mai MD

## 2024-01-19 NOTE — ED INITIAL ASSESSMENT (HPI)
Pt was at pcp for annual physical, EKG done, was aflutter at office, no hx of aflutter. Pt reports that he has been feeling sick over the past few weeks shortness of breath. Pt noted to have dyspnea with exertion denies any chest pain or tightness. Denies dizziness.

## 2024-01-19 NOTE — ED PROVIDER NOTES
Patient Seen in: [facility] Emergency Department      History     Chief Complaint   Patient presents with    Arrythmia/Palpitations    Difficulty Breathing     Stated Complaint: arrthymia, shortness of breath    Subjective:   HPI    Sent from pcp for atrial flutter in office- off and on sob aftr a cough illness for 2 months ago     Objective:   Past Medical History:   Diagnosis Date    Belching     Esophageal reflux     Heartburn     Indigestion     Shortness of breath     Wears glasses               Past Surgical History:   Procedure Laterality Date    COLONOSCOPY  12/2022    HERNIA SURGERY                  Social History     Socioeconomic History    Marital status:    Tobacco Use    Smoking status: Never    Smokeless tobacco: Never   Vaping Use    Vaping Use: Never used   Substance and Sexual Activity    Alcohol use: Yes     Alcohol/week: 6.0 standard drinks of alcohol     Types: 6 Cans of beer per week    Drug use: Never   Other Topics Concern    Weight Concern Yes              Review of Systems    Positive for stated complaint: arrthymia, shortness of breath  Other systems are as noted in HPI.  Constitutional and vital signs reviewed.      All other systems reviewed and negative except as noted above.    Physical Exam     ED Triage Vitals [01/19/24 1246]   /85   Pulse 99   Resp 22   Temp 97.8 °F (36.6 °C)   Temp src Temporal   SpO2 100 %   O2 Device None (Room air)       Current:/85   Pulse 99   Temp 97.8 °F (36.6 °C) (Temporal)   Resp 22   Ht 190.5 cm (6' 3\")   Wt 102.1 kg   SpO2 100%   BMI 28.12 kg/m²         Physical Exam    ***      ED Course     Labs Reviewed   COMP METABOLIC PANEL (14) - Abnormal; Notable for the following components:       Result Value    Glucose 105 (*)     Alkaline Phosphatase 43 (*)     All other components within normal limits   TROPONIN I HIGH SENSITIVITY - Normal   CBC WITH DIFFERENTIAL WITH PLATELET    Narrative:     The following orders were  created for panel order CBC With Differential With Platelet.  Procedure                               Abnormality         Status                     ---------                               -----------         ------                     CBC W/ DIFFERENTIAL[[id number]]                              Final result                 Please view results for these tests on the individual orders.   RAINBOW DRAW LAVENDER   RAINBOW DRAW LIGHT GREEN   RAINBOW DRAW BLUE   CBC W/ DIFFERENTIAL     EKG    Rate, intervals and axes as noted on EKG Report.  Rate: ***  Rhythm: {EDW ED RHYTHM:483817}  Reading: ***              ED Course as of 01/19/24 1859  ------------------------------------------------------------  Time: 01/19 1838  Comment: In from [facility], Cardizem and Eliquis and follow-up on Monday at the appointment with Dr. Pineda patient is stable for discharge home he would never have known that he is in atrial flutter if he had not gone to his doctor's appointment today for chronic cough of the past 2 months     ***         MDM      ***                                   Medical Decision Making    Disposition and Plan     Clinical Impression:  No diagnosis found.     Disposition:  There is no disposition on file for this visit.  There is no disposition time on file for this visit.    Follow-up:  No follow-up provider specified.        Medications Prescribed:  Current Discharge Medication List                                             Alkaline Phosphatase 43 (*)     All other components within normal limits   TROPONIN I HIGH SENSITIVITY - Normal   TSH W REFLEX TO FREE T4 - Normal   PTT, ACTIVATED - Normal   CBC WITH DIFFERENTIAL WITH PLATELET    Narrative:     The following orders were created for panel order CBC With Differential With Platelet.  Procedure                               Abnormality         Status                     ---------                               -----------         ------                     CBC W/ DIFFERENTIAL[[id number]]                              Final result                 Please view results for these tests on the individual orders.   RAINBOW DRAW LAVENDER   RAINBOW DRAW LIGHT GREEN   RAINBOW DRAW BLUE   CBC W/ DIFFERENTIAL     EKG    Rate, intervals and axes as noted on EKG Report.  Rate: 99  Rhythm: atrial flutter   Readin bpm atrial flutter no acute ST elevation or significant ST depression to suggest acute ischemia.  Fairly well controlled on the right actually very regular appearing              ED Course as of 24  ------------------------------------------------------------  Time: 1838  Comment: In from [facility], Cardizem and Eliquis and follow-up on Monday at the appointment with Dr. Pineda patient is stable for discharge home he would never have known that he is in atrial flutter if he had not gone to his doctor's appointment today for chronic cough of the past 2 months     CT ANGIOGRAPHY, CHEST (PID=[id number])   Final Result   PROCEDURE:  CT ANGIOGRAPHY, CHEST (CPT=71275)       COMPARISON:  None.       INDICATIONS:  arrthymia, shortness of breath       TECHNIQUE:  IV contrast-enhanced multislice CT angiography is performed    through the pulmonary arterial anatomy. 3D volume renderings are    generated.  Dose reduction techniques were used. Dose information is    transmitted to the ACR (American College of    Radiology) NRDR (National Radiology Data Registry) which includes the Dose     Index Registry.       PATIENT STATED HISTORY:(As transcribed by Technologist)  Pt reports that    he has been feeling sick over the past few weeks shortness of breath.    States he was at doctors office and dr heard a \"flutter\".         CONTRAST USED:  100cc of Omnipaque 350       FINDINGS:     VASCULATURE:  No visible pulmonary arterial thrombus or attenuation.     THORACIC AORTA:  No aneurysm or visible dissection.     LUNGS:  No visible pulmonary disease.     MEDIASTINUM:  No adenopathy or mass.     SHANNON:  No mass or adenopathy.     CARDIAC:  No enlargement, pericardial thickening, or significant coronary    artery calcification.   PLEURA:  No mass or effusion.     CHEST WALL:  No mass or axillary adenopathy.     LIMITED ABDOMEN:  Low-attenuation lesions within the liver such as in the    left lobe measuring 22 mm and the right lobe measuring 21 mm most    consistent with cysts.  Diffusely low attenuation of the liver can be seen    in fatty infiltration of the liver.   BONES:  No bony lesion or fracture.     OTHER:  Negative.                           =====   CONCLUSION:         1.  No evidence of pulmonary embolus or acute cardiopulmonary process.       2. Fatty infiltration of the liver.               LOCATION:  [facility]           Dictated by (CST): Christophe Melchor MD on 1/19/2024 at 5:04 PM        Finalized by (CST): Christophe Melchor MD on 1/19/2024 at 5:12 PM         XR CHEST PA + LAT CHEST (CPT=71046)   Final Result   PROCEDURE:  XR CHEST PA + LAT CHEST (CPT=71046)       INDICATIONS:  arrthymia, shortness of breath       COMPARISON:  None.       TECHNIQUE:  PA and lateral chest radiographs were obtained.       PATIENT STATED HISTORY: (As transcribed by Technologist)  Patient was sent    to the ED after a doctor's appointment due to an atrial flutter. Described    having recent shortness of breath associated with asthma.             FINDINGS:  Lung volumes are satisfactory.  No consolidation or pleural     effusion.  There is borderline to mild cardiomegaly.  Normal pulmonary    vascularity.  Smooth mediastinal contours.                             =====   CONCLUSION:  Borderline to mild cardiomegaly.           LOCATION:  [facility]           Dictated by (CST): Blayne Godinez MD on 1/19/2024 at 1:02 PM        Finalized by (CST): Blayne Godinez MD on 1/19/2024 at 1:04 PM             Personally and independently reviewed the chest x-ray, no vascular overload no pneumonia no significant cardiomegaly       MDM      58-year-old male presents to the emergency department with his wife directed over from the primary care doctor's office when he was noted to have heart arrhythmia that he did not previously have on physical exam today that was confirmed with an EKG in the primary care physician's office.  Patient has intermittently been having episodes of just feeling tired and short of breath.  Initially he assumed that it was still lingering from a cold that he had had recently but the short of breath episodes do not seem to correlate with the cough cough.  He presents with his wife.  Differential diagnosis include atrial fibrillation atrial flutter electrolyte dysfunction causing cardiac arrhythmia, some sort of previous cardiac event now with resultant atrial fibrillation.    Repeat EKG here in the emergency department would indicate atrial flutter with a normal ventricular rate.  Chest x-ray does not show significant cardiomegaly that would suggest previous cardiac event and troponin is negative electrolytes are benign.  Patient is truly in no acute distress.  He is 58 years old and does not have previous significant medical history.  Thyroid hormones were checked to make sure the patient has not developed hyperthyroidism but those are benign as well and troponin testing is negative.  Patient and wife are reassured, called to discuss [facility] with [facility] as patient actually has an appointment to see Dr. Baltazar in the office please see  above.  CTA of the chest was done out of an abundance of caution to rule out any neoplastic changes, thoracic aortic dilatation though I cannot completely rule out aortic root dilatation that may cause something like this, there were no signs of pulmonary embolus or significant cardiomegaly.  Patient was reassured he is stable for discharge home    We will place him on some Cardizem for rate control and Eliquis until he can be evaluated.  He was invited to return to the emergency department if any symptoms should occur or if the patient has any further worrisome findings until that time.                               Medical Decision Making      Disposition and Plan     Clinical Impression:  1. New onset atrial flutter (HCC)         Disposition:  Discharge  1/19/2024  7:00 pm    Follow-up:  Isaiah Pineda MD  [address] [place] [id number] [phone]    Call            Medications Prescribed:  Discharge Medication List as of 1/19/2024  7:04 PM        START taking these medications    Details   dilTIAZem ER (CARDIZEM CD) 120 MG Oral Capsule SR 24 Hr Take 1 capsule (120 mg total) by mouth daily., Normal, Disp-30 capsule, R-0      fluticasone propionate 110 MCG/ACT Inhalation Aerosol Inhale 2 puffs into the lungs 2 (two) times daily., Normal, Disp-12 g, R-0      apixaban 5 MG Oral Tab Take 2 tabs (10mg) by mouth twice daily for 7 days, then take 1 tab (5mg) by mouth twice daily thereafter., Normal, Disp-74 tablet, R-0

## 2024-01-24 RX ORDER — LEVALBUTEROL TARTRATE 45 UG/1
2 AEROSOL, METERED ORAL EVERY 4 HOURS PRN
Qty: 1 EACH | Refills: 1 | Status: SHIPPED | OUTPATIENT
Start: 2024-01-24

## 2024-01-30 RX ORDER — EPINEPHRINE 0.3 MG/.3ML
0.3 INJECTION SUBCUTANEOUS ONCE
Qty: 1 EACH | Refills: 0 | Status: SHIPPED | OUTPATIENT
Start: 2024-01-30 | End: 2024-01-30

## 2024-02-21 NOTE — PAT NURSING NOTE
Per PAT encounter/[employer] message sent to pt:    Preprocedure Instructions     Visitor Instructions     Adult Patients: 2 Adult [facility] can accompany the patient day of procedure. 2 Care Partners may visit [phone] during inpatient stay     You are scheduled for: a Cardiac Procedure     Date of Procedure: 02/28/24 Wednesday     Diet Instructions: Do not eat or drink anything after midnight     Medications: Medications you are allowed to take can be taken with a sip of water the morning of your procedure     Medications to Stop: Hold herbal supplements and vitamins     Other Medications: Do NOT miss any doses of Eliquis; If you do please reach out to Dr. Pineda's office regarding further instructions.     Arrival Time: You will receive a call the afternoon (Tuesday) before your procedure after 3 pm on what time you should arrive the day of your procedure    Driving After Procedure: If sedation is given, you WILL NOT be able to drive home. You will need a responsible adult  to drive you home., Cannot take uber or cab unless approved by physician     Discharge Teaching: Your nurse will give you specific instructions before discharge, Most people can resume normal activities in 2-3 days, Any questions, please call the physician's office      parking is available starting at 6 am or park in the [place] parking garage at [facility]. Check in at the [facility] reception desk. Our  will be there to check you in for your procedure. Please bring your insurance cards and ID with you.                                                                                                                                      Please DO NOT respond to this message, the inbasket is not monitored for messages. For any questions, please call the physician's office.

## 2024-02-28 ENCOUNTER — HOSPITAL ENCOUNTER (OUTPATIENT)
Age: 59
Discharge: HOME | End: 2024-02-28
Attending: EMERGENCY MEDICINE
Payer: COMMERCIAL

## 2024-02-28 ENCOUNTER — HOSPITAL ENCOUNTER (OUTPATIENT)
Dept: INTERVENTIONAL RADIOLOGY/VASCULAR | Facility: HOSPITAL | Age: 59
Discharge: HOME | End: 2024-02-28
Attending: INTERNAL MEDICINE | Admitting: INTERNAL MEDICINE
Payer: COMMERCIAL

## 2024-02-28 VITALS
BODY MASS INDEX: 27.98 KG/M2 | WEIGHT: 225 LBS | HEART RATE: 80 BPM | SYSTOLIC BLOOD PRESSURE: 128 MMHG | TEMPERATURE: 98 F | HEIGHT: 75 IN | RESPIRATION RATE: 18 BRPM | DIASTOLIC BLOOD PRESSURE: 79 MMHG | OXYGEN SATURATION: 96 %

## 2024-02-28 VITALS
DIASTOLIC BLOOD PRESSURE: 76 MMHG | HEART RATE: 74 BPM | RESPIRATION RATE: 13 BRPM | SYSTOLIC BLOOD PRESSURE: 121 MMHG | OXYGEN SATURATION: 100 % | HEIGHT: 75 IN | TEMPERATURE: 97 F | WEIGHT: 224 LBS | BODY MASS INDEX: 27.85 KG/M2

## 2024-02-28 DIAGNOSIS — I48.91 A-FIB (HCC): ICD-10-CM

## 2024-02-28 DIAGNOSIS — M25.512 ACUTE PAIN OF LEFT SHOULDER: Primary | ICD-10-CM

## 2024-02-28 LAB
ATRIAL RATE: 89 BPM
P AXIS: 59 DEGREES
P-R INTERVAL: 204 MS
Q-T INTERVAL: 352 MS
QRS DURATION: 78 MS
QTC CALCULATION (BEZET): 428 MS
R AXIS: 24 DEGREES
T AXIS: 62 DEGREES
VENTRICULAR RATE: 89 BPM

## 2024-02-28 PROCEDURE — 93010 ELECTROCARDIOGRAM REPORT: CPT | Performed by: INTERNAL MEDICINE

## 2024-02-28 PROCEDURE — 93005 ELECTROCARDIOGRAM TRACING: CPT

## 2024-02-28 PROCEDURE — 99214 OFFICE O/P EST MOD 30 MIN: CPT

## 2024-02-28 PROCEDURE — 99213 OFFICE O/P EST LOW 20 MIN: CPT

## 2024-02-28 PROCEDURE — 92960 CARDIOVERSION ELECTRIC EXT: CPT | Performed by: INTERNAL MEDICINE

## 2024-02-28 PROCEDURE — 5A2204Z RESTORATION OF CARDIAC RHYTHM, SINGLE: ICD-10-PCS | Performed by: INTERNAL MEDICINE

## 2024-02-28 RX ORDER — AMIODARONE HYDROCHLORIDE 200 MG/1
200 TABLET ORAL DAILY
Qty: 90 TABLET | Refills: 1 | Status: SHIPPED | OUTPATIENT
Start: 2024-02-28

## 2024-02-28 RX ORDER — TRAMADOL HYDROCHLORIDE 50 MG/1
TABLET ORAL EVERY 6 HOURS PRN
Qty: 20 TABLET | Refills: 0 | Status: SHIPPED | OUTPATIENT
Start: 2024-02-28

## 2024-02-28 RX ORDER — METHOHEXITAL IN WATER/PF 100MG/10ML
80 SYRINGE (ML) INTRAVENOUS ONCE
Status: COMPLETED | OUTPATIENT
Start: 2024-02-28 | End: 2024-02-28

## 2024-02-28 RX ORDER — METHOHEXITAL IN WATER/PF 100MG/10ML
SYRINGE (ML) INTRAVENOUS
Status: COMPLETED
Start: 2024-02-28 | End: 2024-02-28

## 2024-02-28 RX ORDER — SODIUM CHLORIDE 9 MG/ML
INJECTION, SOLUTION INTRAVENOUS CONTINUOUS
Status: DISCONTINUED | OUTPATIENT
Start: 2024-02-28 | End: 2024-02-28

## 2024-02-28 RX ORDER — AMIODARONE HYDROCHLORIDE 200 MG/1
400 TABLET ORAL ONCE
Status: COMPLETED | OUTPATIENT
Start: 2024-02-28 | End: 2024-02-28

## 2024-02-28 RX ADMIN — METHOHEXITAL IN WATER/PF 80 MG: 100MG/10ML SYRINGE (ML) INTRAVENOUS at 08:44:00

## 2024-02-28 RX ADMIN — AMIODARONE HYDROCHLORIDE 400 MG: 200 TABLET ORAL at 09:45:00

## 2024-02-28 NOTE — PROGRESS NOTES
Pt s/p cardioversion with Dr. Pineda. Post procedure EKG done, confirms sinus rhythm. VSS. Pt awake and tolerating PO. Discharge instructions reviewed with patient and his wife, copy given and all questions answered. IV removed. Pt discharged via wheelchair to [facility], wife to drive patient home.

## 2024-02-28 NOTE — H&P
[facility]   History and Physical    Miguel Angel Guajardo Patient Status:  Outpatient    1965 MRN [id number]   Location [facility] INTERVENTIONAL SUITES Attending Isaiah Pineda MD   Hosp Day # 0 PCP tSiven Mai MD         Please see scanned recent H&P from [facility] office in 2024.    History reviewed and up to date. No changes to H&P. The patient is scheduled for cardioversion  of symptomatic atrial fib/flutter - patient has been anticoagulated with Eliquis with no interruption over the last 6 weeks.    Patient was consented. The risks and benefits of the procedure were explained to the patient and she wishes to proceed.    D/w pt, his wife and the Nurses    Markel Pineda M.D., F.A.C.C.  Interventional Cardiology  Advanced Heart Failure  [facility]    2024

## 2024-02-28 NOTE — ED INITIAL ASSESSMENT (HPI)
Pt states has had intermittent left shoulder pain for last couple years.    Had a cardioversion this am.   States pain starts in left scapula radiating to left shoulder.   Increase pain when lifting up arm.      Denies tingling numbness to arm.     Taken tylenol for pain

## 2024-02-28 NOTE — PROCEDURES
[facility]    PATIENT'S NAME: LIZ DRUMMOND   ATTENDING PHYSICIAN: Isaiah Pineda M.D.   OPERATING PHYSICIAN: Isaiah Pineda M.D.   PATIENT ACCOUNT#:   [id number]    LOCATION:  [address]  MEDICAL RECORD #:   [id number]       YOB: 1965  ADMISSION DATE:       02/28/2024      OPERATION DATE:  02/28/2024    CARDIAC PROCEDURE TRANSCRIPTION      ELECTRICAL CARDIOVERSION    PREOPERATIVE DIAGNOSIS:    1.   Symptomatic atrial fibrillation with rapid ventricular response.  2.   Dyspnea on exertion.  3.   Palpitations.  4.   Fatigue.  POSTOPERATIVE DIAGNOSIS:    1.   Symptomatic atrial fibrillation with rapid ventricular response.  2.   Dyspnea on exertion.  3.   Palpitations.  4.   Fatigue.  PROCEDURE PERFORMED:    1.   Successful electrical cardioversion of symptomatic rapid atrial fibrillation with synchronized shock with energy level of 250 joules x1.  2.   Sedation.    SEDATION:  We performed sedation with IV Brevital for total dose of 80 mg.  The procedure started at 8:52 a.m. and ended at 8:54 a.m.  Blood pressure, pulse ox, and heart rate were monitored by the nurse and myself, and IV line was checked by the nurse and myself.    DESCRIPTION OF PROCEDURE:  IV fluids were open.  Defibrillator pads were placed on front and back of the chest.  The patient was sedated with Brevital gradually and then we delivered a synchronized shock with energy level of 250 joules x1.  Patient converted from rapid atrial fibrillation to sinus.    HEMODYNAMICS:  Blood pressure one-teens over 70s with a heart rate in the 130s to 150s beats per minute, being in rapid atrial fibrillation.  Hemodynamics after cardioversion:  Heart rate 80s to 90s with blood pressure of 120/80 mmHg.  Pulse ox was above 93% all the time on of 2 L of oxygen through nasal cannula.    COMPLICATIONS:  None.    IMPRESSION:    1.   Successful electrical cardioversion of symptomatic rapid atrial fibrillation back to sinus  rhythm with 1 synchronized shock with energy level of 250 joules x1.  2.   No complications.      DISCUSSION AND PLAN:    1.   EKG post cardioversion-done.  EKG shows sinus rhythm, 89 beats per minute, with no ischemia or pathological Q waves.  Normal intervals and normal axis with  milliseconds, corrected QT interval 428 milliseconds, MD interval 204 milliseconds, and QRS 78 milliseconds.  Normal axis.  2.   Start amiodarone 400 mg p.o. twice a day for 3 days, including today, and then will go down to a maintenance dose of 200 mg p.o. daily on day #4.  3.   Walk-in EKG in the clinic on Friday, in 2 days, for QT interval and heart rate assessment in [facility] office and follow up with me in [facility] clinic in 1 to 2 weeks as scheduled.  4.   Continue Cardizem and Eliquis as scheduled.  5.   I will plan to switch amiodarone to a different antiarrhythmic, but we need to rule out coronary artery disease and I will consider most likely a nuclear stress test in the near future.  6.   Avoid excessive coffee.  7.   Avoid alcohol now.     All was discussed with the patient, his wife, and the nursing staff.  Thank you for allowing me to participate in this patient's care.  Should you have any questions, feel free to contact me.     Dictated By Isaiah Pineda M.D.  d: 02/28/2024 09:06:07  t: 02/28/2024 09:48:43  Job [id number]  SS/    cc: Stiven Mai MD

## 2024-02-28 NOTE — ED PROVIDER NOTES
Patient Seen in: [facility]      History     Chief Complaint   Patient presents with    Arm or Hand Injury     Extreme shoulder pain - Entered by patient    Shoulder Pain     Stated Complaint: Arm or Hand Injury - Extreme shoulder pain    Subjective:   HPI    57 yo male with with left shoulder pain. He has had ongoing pain in the left shoulder for some time. Today he had an ablation for afib and when he woke from the procedure the pain in the left shoulder area is much worse. Pain with range of motion. No numbness or weakness but decreased range of motion due to pain.   No chest pain. No shortness of breath. Was successfully cardioverted. No palpitations.     Objective:   Past Medical History:   Diagnosis Date    Belching     Esophageal reflux     Heartburn     Indigestion     Shortness of breath     Wears glasses               Past Surgical History:   Procedure Laterality Date    COLONOSCOPY  12/2022    HERNIA SURGERY      NEEDLE BIOPSY LEFT                  No pertinent social history.            Review of Systems    Positive for stated complaint: Arm or Hand Injury - Extreme shoulder pain  Other systems are as noted in HPI.  Constitutional and vital signs reviewed.      All other systems reviewed and negative except as noted above.    Physical Exam     ED Triage Vitals [02/28/24 1718]   /79   Pulse 80   Resp 18   Temp 97.9 °F (36.6 °C)   Temp src Temporal   SpO2 96 %   O2 Device None (Room air)       Current:/79   Pulse 80   Temp 97.9 °F (36.6 °C) (Temporal)   Resp 18   Ht 190.5 cm (6' 3\")   Wt 102.1 kg   SpO2 96%   BMI 28.12 kg/m²         Physical Exam  Vitals and nursing note reviewed.   Constitutional:       Appearance: Normal appearance. He is well-developed.   HENT:      Head: Normocephalic and atraumatic.   Cardiovascular:      Rate and Rhythm: Normal rate and regular rhythm.   Pulmonary:      Effort: Pulmonary effort is normal. No respiratory distress.    Musculoskeletal:      Comments: Left shoulder tenderness anteriorly. Range of motion is limited due to pain. Distal neurovascular intact.    Skin:     General: Skin is warm and dry.      Capillary Refill: Capillary refill takes less than 2 seconds.   Neurological:      General: No focal deficit present.      Mental Status: He is alert.      Sensory: No sensory deficit.   Psychiatric:         Mood and Affect: Mood normal.         Behavior: Behavior normal.              ED Course   Labs Reviewed - No data to display                   MDM                                      Medical Decision Making  Rotator cuff pathology, bony pathology, muscle strain all in differential. History and physical concerning for rotator cuff pathology. Sling, ice, tramadol and follow up with ortho. Xray imaging considered but not indicated as no trauma occurred.     Disposition and Plan     Clinical Impression:  1. Acute pain of left shoulder         Disposition:  Discharge  2/28/2024  5:42 pm    Follow-up:  Danyell Brown MD  [address] [place] [address]  [phone]    Call in 1 day            Medications Prescribed:  Current Discharge Medication List        START taking these medications    Details   traMADol 50 MG Oral Tab Take 1-2 tablets ( mg total) by mouth every 6 (six) hours as needed for Pain.  Qty: 20 tablet, Refills: 0    Associated Diagnoses: Acute pain of left shoulder

## 2024-02-28 NOTE — DISCHARGE INSTRUCTIONS
Tylenol over the counter every six hours   Ice frequently  Sling  Call ortho tomorrow for follow up appointment  Ultram for breakthrough pain

## 2024-02-28 NOTE — DISCHARGE INSTRUCTIONS
Go to [facility] office Friday any time after 8am for EKG (office called, note was put in chart)    Start taking amiodarone. Take 2 tabs (400mg) tonight 2/28/24. Then take 2 tabs (400mg) twice a day this Thursday (2/29) and Friday (3/1). Starting Saturday morning (3/2) take 1 tab (200mg) daily.         HOME CARE INSTRUCTIONS FOLLOWING CARDIOVERSION OR ICD EVALUATION      Activity:  - DO NOT drive after the procedure. You may resume driving after 24 hours.  - DO NOT operate any machinery (including kitchen appliances or power tools).  - Avoid drinking alcohol for 24 hours.  - You may resume your normal activity after 24 hours.    What is Normal:  - Your skin may be red where the patches were placed.  - The redness may last 2 to 3 days and feel like a \"sunburn\".  - You may treat the area with an over-the-counter cream that is used for sunburned skin.    Special Instructions:  - If you were taking the medication Eliquis, Xarelto, Pradaxa or Coumadin, it is very important to continue taking it until your follow-up appointment with your physician.    When you should NOTIFY YOUR PHYSICIAN:  - If you feel that you have returned to an irregular rhythm  - If you have an ICD, any time your ICD device fires    Other:  - You may resume your present diet, unless otherwise specified by your physician.  - You may resume all of your medications as prescribed, unless otherwise directed by your physician.  - A list of your medications was provided to you at discharge.  - Please call your physician's office for a follow-up appointment. You should be seen in 2 weeks.

## 2024-02-29 ENCOUNTER — TELEPHONE (OUTPATIENT)
Dept: ORTHOPEDICS CLINIC | Facility: CLINIC | Age: 59
End: 2024-02-29

## 2024-02-29 DIAGNOSIS — R52 PAIN: Primary | ICD-10-CM

## 2024-03-01 ENCOUNTER — HOSPITAL ENCOUNTER (OUTPATIENT)
Dept: GENERAL RADIOLOGY | Age: 59
Discharge: HOME | End: 2024-03-01
Attending: PHYSICIAN ASSISTANT
Payer: COMMERCIAL

## 2024-03-01 ENCOUNTER — OFFICE VISIT (OUTPATIENT)
Dept: ORTHOPEDICS CLINIC | Facility: CLINIC | Age: 59
End: 2024-03-01
Payer: COMMERCIAL

## 2024-03-01 VITALS — BODY MASS INDEX: 27.98 KG/M2 | HEIGHT: 75 IN | WEIGHT: 225 LBS

## 2024-03-01 DIAGNOSIS — R52 PAIN: ICD-10-CM

## 2024-03-01 DIAGNOSIS — M75.02 ADHESIVE CAPSULITIS OF LEFT SHOULDER: Primary | ICD-10-CM

## 2024-03-01 DIAGNOSIS — S46.002A ROTATOR CUFF INJURY, LEFT, INITIAL ENCOUNTER: ICD-10-CM

## 2024-03-01 PROCEDURE — 73030 X-RAY EXAM OF SHOULDER: CPT | Performed by: PHYSICIAN ASSISTANT

## 2024-03-01 NOTE — H&P
[facility] - ORTHOPEDICS  [address] [place] [id number]  [phone]     NEW PATIENT VISIT - HISTORY AND PHYSICAL EXAMINATION     Name: Miguel Angel Guajardo   MRN: [id number]  Date: 3/1/2024     CC: Left shoulder pain.    REFERRED BY: Stiven Mai MD    HPI:   Miguel Angel Guajardo is a very pleasant 58 year old left-hand dominant male who presents today for evaluation, consultation, and management of left shoulder pain - developed recently after a cardioversion on 02/28/2024. He complains of swelling, stiffness, and numbness, and tingling. 8/10. He is on eliquis.     He recently was evaluated at the  in [place] and was referred to our service for further evaluationa nd management.     PMH:   Past Medical History:   Diagnosis Date    Belching     Esophageal reflux     Heartburn     Indigestion     Shortness of breath     Wears glasses        PAST SURGICAL HX:  Past Surgical History:   Procedure Laterality Date    CARDIOVERSION  02/28/2024    COLONOSCOPY  12/2022    HERNIA SURGERY      NEEDLE BIOPSY LEFT         FAMILY HX:  Family History   Problem Relation Age of Onset    Cancer Father     Diabetes Mother     Diabetes Son     Dementia Maternal Grandmother        ALLERGIES:  Penicillins    MEDICATIONS:   Current Outpatient Medications   Medication Sig Dispense Refill    amiodarone 200 MG Oral Tab Take 1 tablet (200 mg total) by mouth daily. 90 tablet 1    traMADol 50 MG Oral Tab Take 1-2 tablets ( mg total) by mouth every 6 (six) hours as needed for Pain. 20 tablet 0    pantoprazole 40 MG Oral Tab EC Take one tablet (40 mg total) by mouth once daily, 30 minutes prior to breakfast. 90 tablet 0    Multiple Vitamins-Minerals (MULTIVITAMIN GUMMIES ADULTS OR) Take 2 tablets by mouth daily.      [employer] FIBER GUMMIES OR Take 1-2 tablets by mouth daily.      Levalbuterol Tartrate (XOPENEX HFA) 45 MCG/ACT Inhalation Aerosol Inhale 2 puffs into the lungs every 4 (four) hours as needed  for Wheezing. 1 each 1    dilTIAZem ER (CARDIZEM CD) 120 MG Oral Capsule SR 24 Hr Take 1 capsule (120 mg total) by mouth daily. 30 capsule 0    fluticasone propionate 110 MCG/ACT Inhalation Aerosol Inhale 2 puffs into the lungs 2 (two) times daily. 12 g 0    apixaban 5 MG Oral Tab Take 2 tabs (10mg) by mouth twice daily for 7 days, then take 1 tab (5mg) by mouth twice daily thereafter. (Patient taking differently: Take 1 tablet (5 mg total) by mouth 2 (two) times daily.) 74 tablet 0    Lidocaine 4 % External Ointment Apply 50 g topically in the morning, at noon, and at bedtime. (Patient taking differently: Apply 50 g topically as needed.) 50 g 0    fluticasone propionate 50 MCG/ACT Nasal Suspension by Each Nare route daily.      cetirizine 10 MG Oral Tab Take 1 tablet (10 mg total) by mouth daily.      albuterol 108 (90 Base) MCG/ACT Inhalation Aero Soln Inhale 2 puffs into the lungs every 4 (four) hours as needed. (Patient not taking: Reported on 2/21/2024)         ROS: A comprehensive 14 point review of systems was performed and was negative aside from the aforementioned per history of present illness.    SOCIAL HX:  Social History     Occupational History    Not on file   Tobacco Use    Smoking status: Never    Smokeless tobacco: Never   Vaping Use    Vaping Use: Never used   Substance and Sexual Activity    Alcohol use: Yes     Alcohol/week: 6.0 standard drinks of alcohol     Types: 6 Cans of beer per week    Drug use: Never    Sexual activity: Not on file        PE:   Vitals:    03/01/24 0727   Weight: 225 lb (102.1 kg)   Height: 6' 3\" (1.905 m)     Estimated body mass index is 28.12 kg/m² as calculated from the following:    Height as of this encounter: 6' 3\" (1.905 m).    Weight as of this encounter: 225 lb (102.1 kg).    Physical Exam  Constitutional:       Appearance: Normal appearance.   HENT:      Head: Normocephalic and atraumatic.   Eyes:      Extraocular Movements: Extraocular movements intact.   Neck:       Musculoskeletal: Normal range of motion and neck supple.   Cardiovascular:      Pulses: Normal pulses.   Pulmonary:      Effort: Pulmonary effort is normal. No respiratory distress.   Abdominal:      General: There is no distension.   Skin:     General: Skin is warm.      Capillary Refill: Capillary refill takes less than 2 seconds.      Findings: No bruising.   Neurological:      General: No focal deficit present.      Mental Status: Alert.   Psychiatric:         Mood and Affect: Mood normal.     Examination of the left shoulder demonstrates:     Skin is intact, warm and dry.   Cervical:  Full ROM  Spurling's  Negative    Deformity:   none  Atrophy:   none    Scapular winging: Negative    Palpation:     AC Joint   Negative  Biceps Tendon  Positive  Greater Tuberosity Positive    ROM:   Forward Flexion:  120°  Abduction:   full and symmetric  External Rotation:  30°  Internal Rotation:  thoracolumbar junction    Rotator Cuff Strength:   Supraspinatus:   5/5  Subscapularis:   5/5  Infraspinatus/Teres: 5/5    Provocative Tests:   Boyd:   Positive  Speed's:   Positive  Ona's:   Negative  Lift-off:   Negative  Apprehension:  Negative  Sulcus Sign:   Negative    Neurovascular Upper Extremity (Bilateral)  Motor:    5/5 EPL, Finger Abduction, , Pinch, Deltoid  Sensation:   intact to light touch median, ulnar, radial and axillary nerve  Circulation:   Normal, 2+ radial pulse    The contralateral upper extremity is without limitation in range of motion or strength, no positive provocative maneuvers.     Radiographic Examination/Diagnostics:    I personally viewed, independently interpreted and radiology report was reviewed.    X-ray 03/01/2024, Left Shoulder- No evidence of fracture, or dislocation.     IMPRESSION: Miguel Angel Guajardo is a 58 year old Left hand dominant male - with concerns of rotator cuff injury, with an element of left adhesive capsulitis.     PLAN:   We had a detailed discussion  outlining the etiology, anatomy, pathophysiology, and natural history of the patient's findings. Imaging was reviewed in detail and correlated to a 3-dimensional model of the patient's pathology.     We reviewed the treatment of this disease condition. In light of the chronicity of symptoms, loss of normal function, and  failure to progress conservatively we recommend an MRI to evaluate the integrity of the patient's left rotator cuff. The patient will follow up after imaging.   Differential diagnosis includes but not limited to: rotator cuff/labral pathology, impingement, tendinopathy, cartilage injury/loose body, bone marrow edema, and osteoarthritis.     External records were also reviewed for pertinent historical findings contributing to the patients undiagnosed new problem with uncertain prognosis.     The patient had the opportunity to ask questions, and all questions were answered appropriately.         FOLLOW-UP:   Return to clinic following completion of MRI to review scan and findings.           [facility], PA-C Orthopedic Surgery / Sports Medicine Specialist  [facility]  [address] [place] [address] [id number] [place] [email or web address]  [email or web address]  t: [phone]  o: [phone]  f: [phone]    This note was dictated using Dragon software.  While it was briefly proofread prior to completion, some grammatical, spelling, and word choice errors due to dictation may still occur.

## 2024-03-08 ENCOUNTER — HOSPITAL ENCOUNTER (OUTPATIENT)
Dept: MRI IMAGING | Facility: HOSPITAL | Age: 59
Discharge: HOME | End: 2024-03-08
Attending: PHYSICIAN ASSISTANT
Payer: COMMERCIAL

## 2024-03-08 DIAGNOSIS — S46.002A ROTATOR CUFF INJURY, LEFT, INITIAL ENCOUNTER: ICD-10-CM

## 2024-03-08 DIAGNOSIS — M75.02 ADHESIVE CAPSULITIS OF LEFT SHOULDER: ICD-10-CM

## 2024-03-08 PROCEDURE — 73221 MRI JOINT UPR EXTREM W/O DYE: CPT | Performed by: PHYSICIAN ASSISTANT

## 2024-03-11 ENCOUNTER — OFFICE VISIT (OUTPATIENT)
Dept: ORTHOPEDICS CLINIC | Facility: CLINIC | Age: 59
End: 2024-03-11
Payer: COMMERCIAL

## 2024-03-11 DIAGNOSIS — M75.42 SUBACROMIAL IMPINGEMENT OF LEFT SHOULDER: ICD-10-CM

## 2024-03-11 DIAGNOSIS — M19.019 AC JOINT ARTHROPATHY: ICD-10-CM

## 2024-03-11 DIAGNOSIS — M75.22 BICEPS TENDINITIS OF LEFT UPPER EXTREMITY: ICD-10-CM

## 2024-03-11 DIAGNOSIS — M75.122 NONTRAUMATIC COMPLETE TEAR OF LEFT ROTATOR CUFF: Primary | ICD-10-CM

## 2024-03-11 PROCEDURE — 99215 OFFICE O/P EST HI 40 MIN: CPT | Performed by: ORTHOPAEDIC SURGERY

## 2024-03-11 PROCEDURE — 99417 PROLNG OP E/M EACH 15 MIN: CPT | Performed by: ORTHOPAEDIC SURGERY

## 2024-03-11 RX ORDER — ALBUTEROL SULFATE 2.5 MG/3ML
SOLUTION RESPIRATORY (INHALATION)
COMMUNITY
Start: 2023-12-10

## 2024-03-11 RX ORDER — DILTIAZEM HYDROCHLORIDE 120 MG/1
120 CAPSULE, EXTENDED RELEASE ORAL DAILY
COMMUNITY
Start: 2024-01-22

## 2024-03-11 NOTE — PROGRESS NOTES
OR BOOKING SHEET SHOULDER  Location: [x] [facility]   [x] [facility]  Name: Miguel Angel Guajardo  MRN: [id number]   : 1965  Diagnos  [x] Nontraumatic complete tear of left rotator cuff [M75.122]  Disposition:    [x] Ambulatory  [] Overnight for MARK  [] Overnight for observation and pain control  [] Inpatient procedure    Operative Time Required:  2 hours ([facility])   Antibiotics: 2 g cefazolin within 60 minutes of surgical incision  Procedure:   Laterality: [] RIGHT [x] LEFT                  [] BILATERAL  Procedures:   [x] Shoulder Arthroscopy    [x] Rotator Cuff Repair (92774)  [x] Arthroscopic Biceps Tenodesis (95118)  [x] Subacromial Decompression (23706)  [x] Distal Clavicle Excision (90529)    [] SLAP repair (19569)  [] Capsulorraphy / Bankart (12428)    Additional info:   [x] PCP Clearance Needed  [] MRSA  [] C-Arm  [x] TXA at time surgery  [x] Physical Therapy External - [facility]  [x] DME Rx Needed  [] Appt with Dr. Brown needed  Implants needed: Arthrex, [place]  Positioning Equipment: Beach Chair Setup, [place]

## 2024-03-11 NOTE — H&P
Orthopaedic Surgery  [address] [place] [id number]  [phone]     PRE SURGICAL - HISTORY AND PHYSICAL EXAMINATION     Name: Miguel Angel Guajardo   MRN: [id number]  Date: 3/11/2024     CC: Left shoulder pain and weakness in the setting of rotator cuff pathology.     HPI:   Miguel Angel Guajardo is a very pleasant 58 year old left-hand dominant male who presents today for evaluation of MRI scan of the shoulder and discussion regarding definitive management plan.     To summarize: left shoulder pain - developed recently after a cardioversion on 02/28/2024. He complains of swelling, stiffness, and numbness, and tingling. Sleeping at night is difficult. 8/10. He is on eliquis.      He recently was evaluated at the  in [place] and was referred to our service for further evaluation and management.     At the patient's last visit, evaluation was performed by my colleague Nicoller SUYAPA Brantley who recommended an MRI. The patient was then referred to me for consultation, and presents today for further discussion.       PMH:   Past Medical History:   Diagnosis Date    Belching     Esophageal reflux     Heartburn     Indigestion     Shortness of breath     Wears glasses        PAST SURGICAL HX:  Past Surgical History:   Procedure Laterality Date    CARDIOVERSION  02/28/2024    COLONOSCOPY  12/2022    HERNIA SURGERY      NEEDLE BIOPSY LEFT         FAMILY HX:  Family History   Problem Relation Age of Onset    Cancer Father     Diabetes Mother     Diabetes Son     Dementia Maternal Grandmother        ALLERGIES:  Penicillins    MEDICATIONS:   Current Outpatient Medications   Medication Sig Dispense Refill    albuterol (2.5 MG/3ML) 0.083% Inhalation Nebu Soln inhale the contents of 1 vial ( 3 mLs ) via nebulizer every 6 hours as needed for Shortness of Breath.      dilTIAZem HCl  MG Oral Capsule SR 24 Hr Take 1 capsule (120 mg total) by mouth daily.      amiodarone 200 MG Oral Tab Take 1 tablet (200 mg total)  by mouth daily. 90 tablet 1    pantoprazole 40 MG Oral Tab EC Take one tablet (40 mg total) by mouth once daily, 30 minutes prior to breakfast. 90 tablet 0    Multiple Vitamins-Minerals (MULTIVITAMIN GUMMIES ADULTS OR) Take 2 tablets by mouth daily.      [employer] FIBER GUMMIES OR Take 1-2 tablets by mouth daily.      Levalbuterol Tartrate (XOPENEX HFA) 45 MCG/ACT Inhalation Aerosol Inhale 2 puffs into the lungs every 4 (four) hours as needed for Wheezing. 1 each 1    dilTIAZem ER (CARDIZEM CD) 120 MG Oral Capsule SR 24 Hr Take 1 capsule (120 mg total) by mouth daily. 30 capsule 0    fluticasone propionate 110 MCG/ACT Inhalation Aerosol Inhale 2 puffs into the lungs 2 (two) times daily. 12 g 0    apixaban 5 MG Oral Tab Take 2 tabs (10mg) by mouth twice daily for 7 days, then take 1 tab (5mg) by mouth twice daily thereafter. (Patient taking differently: Take 1 tablet (5 mg total) by mouth 2 (two) times daily.) 74 tablet 0    Lidocaine 4 % External Ointment Apply 50 g topically in the morning, at noon, and at bedtime. (Patient taking differently: Apply 50 g topically as needed.) 50 g 0    fluticasone propionate 50 MCG/ACT Nasal Suspension by Each Nare route daily.      cetirizine 10 MG Oral Tab Take 1 tablet (10 mg total) by mouth daily.      betamethasone valerate 0.1 % External Ointment Apply to AFFECTED AREA(S) of left knee and hands twice a day for 2-3 weeks then as needed for flares (Patient not taking: Reported on 3/11/2024)      traMADol 50 MG Oral Tab Take 1-2 tablets ( mg total) by mouth every 6 (six) hours as needed for Pain. (Patient not taking: Reported on 3/11/2024) 20 tablet 0    albuterol 108 (90 Base) MCG/ACT Inhalation Aero Soln Inhale 2 puffs into the lungs every 4 (four) hours as needed. (Patient not taking: Reported on 2/21/2024)         ROS: A comprehensive 14 point review of systems was performed and was negative aside from the aforementioned per history of present illness.    SOCIAL HX:  Social  History     Tobacco Use    Smoking status: Never    Smokeless tobacco: Never   Substance Use Topics    Alcohol use: Yes     Alcohol/week: 6.0 standard drinks of alcohol     Types: 6 Cans of beer per week       PE:   There were no vitals filed for this visit.  Estimated body mass index is 28.12 kg/m² as calculated from the following:    Height as of 3/1/24: 6' 3\" (1.905 m).    Weight as of 3/1/24: 225 lb (102.1 kg).    Physical Exam  Constitutional:       Appearance: Normal appearance.   HENT:      Head: Normocephalic and atraumatic.   Eyes:      Extraocular Movements: Extraocular movements intact.   Neck:      Musculoskeletal: Normal range of motion and neck supple.   Cardiovascular:      Pulses: Normal pulses.   Pulmonary:      Effort: Pulmonary effort is normal. No respiratory distress.   Abdominal:      General: There is no distension.   Skin:     General: Skin is warm.      Capillary Refill: Capillary refill takes less than 2 seconds.      Findings: No bruising.   Neurological:      General: No focal deficit present.      Mental Status: Alert.   Psychiatric:         Mood and Affect: Mood normal.     Examination of the left shoulder demonstrates:     Skin is intact, warm and dry.   Cervical:  Full ROM  Spurling's  Negative    Deformity:   none  Atrophy:   none    Scapular winging: Negative    Palpation:     AC Joint  Positive  Biceps Tendon  Positive  Greater Tuberosity Negative    ROM:   Forward Flexion:  full and symmetric  Abduction:   full and symmetric  External Rotation:  full and symmetric  Internal Rotation:  full and symmetric    Rotator Cuff Strength:   Supraspinatus:   4/5  Subscapularis:   5/5  Infraspinatus/Teres: 4/5    Provocative Tests:   Boyd:   Positive  Speed's:   Positive  [place]'s:   Positive  Lift-off:    Negative  Apprehension:  Negative  Sulcus Sign:   Negative    Neurovascular Upper Extremity (Bilateral)  Motor:    5/5 EPL, Finger Abduction, , Pinch, Deltoid  Sensation:   intact  to light touch median, ulnar, radial and axillary nerve  Circulation:   Normal, 2+ radial pulse    The contralateral upper extremity is without limitation in range of motion or strength, no positive provocative maneuvers.     Radiographic Examination/Diagnostics:    XR and MRI of the shoulder personally viewed, independently interpreted and radiology report was reviewed.    MRI SHOULDER, LEFT (CPT=73221)    Result Date: 3/8/2024  PROCEDURE:  MRI SHOULDER, LEFT (CPT=73221)  COMPARISON:  None.  INDICATIONS:  S46.002A Rotator cuff injury, left, initial encounter M75.02 Adhesive capsulitis of left shoulder  TECHNIQUE:  Multiplanar imaging of the shoulder including oblique coronal, axial and sagittal imaging was acquired including proton density fat suppression technique. Images were performed without intravenous gadolinium.  PATIENT STATED HISTORY: (As transcribed by Technologist)  Patient complains of left shoulder pain - developed recently after a cardioversion on 02/28/2024. Pain worse to lateral aspect and also complains of swelling, stiffness, and numbness, and tingling.    FINDINGS:  ROTATOR CUFF:  There is a full-thickness tear of the supraspinatus tendon with 1.4 cm of retraction.  There is mild to moderate tendinosis within the remaining tendon.  The infraspinatus tendon demonstrates mild tendinosis distally.  The subscapularis tendon is unremarkable.  There is fluid within the subacromial/subdeltoid bursa which may be due to the full-thickness tear. MUSCULATURE:  There is mild atrophy of the supraspinatus musculature. AC JOINT/ACROMION:  There is mild degenerative changes of the acromioclavicular joint with a type 1 acromion.  There is mild superior translocation of the humeral head.  The subacromial space is 6 mm. BICEPS/LABRAL COMPLEX:  Biceps tendon is intact without significant tendinosis.  The anchor and glenoid labrum are within normal limits without evidence of SLAP tear.  GLENOHUMERAL JOINT:  No  significant arthritis or acute injury.  Normal marrow and cortical signal.  Unremarkable capsular insertions.  Small joint effusion without synovitis.            CONCLUSION:     1. Full-thickness tear with 1.4 cm of retraction involving the supraspinatus tendon.    2. Mild tendinosis of the infraspinatus tendon.    3. Mild degenerative changes of the acromioclavicular joint.     LOCATION:  [facility]   Dictated by (CST): Christophe Melchor MD on 3/08/2024 at 3:27 PM     Finalized by (CST): Christophe Melchor MD on 3/08/2024 at 3:31 PM       XR SHOULDER, COMPLETE (MIN 2 VIEWS), LEFT (CPT=73030)    Result Date: 3/1/2024  PROCEDURE:  XR SHOULDER, COMPLETE (MIN 2 VIEWS), LEFT (CPT=73030)  TECHNIQUE:  Multiple views were obtained.  COMPARISON:  None.  INDICATIONS:  R52 Pain, shoulder pain  PATIENT STATED HISTORY: (As transcribed by Technologist)  Patient is having an orthopedic evaluation.  Patient stated he had a cardiac procedure done 2 days ago and afterwards he started having Left shoulder pain that radiates down his Left arm.  Patient  denies injury to his Left shoulder.   FINDINGS:  No evidence of acute displaced fracture or dislocation.  Normal mineralization.  Unremarkable soft tissues.  Mild osteoarthritic changes in the left acromioclavicular joint.            CONCLUSION:  No evidence of acute displaced fracture or dislocation in the left shoulder.  LOCATION:  [facility]   Dictated by ([facility]): Alexx Brown MD on 3/01/2024 at 7:32 AM     Finalized by ([facility]): Alexx Brown MD on 3/01/2024 at 7:33 AM         IMPRESSION: Miguel Angel Guajardo is a 58 year old male with Left shoulder full thickness supraspinatus tear, subacromial impingement, AC joint arthropathy and biceps tendinitis sustained 02/28/2024 suspected after a cardioversion.    The patient has failed an appropriate course of nonsurgical conservative management and is a candidate for arthroscopic rotator cuff repair, subacromial decompression, biceps tenodesis, and  distal clavicle excision.    PLAN:   We had a detailed discussion outlining the etiology, anatomy, pathophysiology, and natural history of rotator cuff pathology of the shoulder. Imaging was reviewed in detail and correlated to a 3-dimensional model of the shoulder.     I had a lengthy discussion with Miguel Angel about the diagnosis and options, both surgical and nonsurgical. I have recommended that we proceed with arthroscopic rotator cuff repair and likely biceps tenodesis as we agree surgical intervention would likely offer the best opportunity for symptomatic relief and functional recovery. I used imaging studies and a 3-dimensional model to outline his pathology, as well as general surgical principles. We reviewed the risks associated with shoulder arthroscopy.   In particular we discussed risks that include, but are not limited to infection, blood loss, potential transient or permanent injury to nerves or blood vessels, joint stiffness, persistent pain, need for future operation, failure of healing, wound complications, failure of therapeutic intervention, risk of re-injury, fixation failure, deep vein thrombosis and pulmonary embolism. We discussed the proposed rehabilitation timeline as well as expected postoperative restrictions.     Most post-surgical patients after rotator cuff repair utilize a shoulder immobilizer/sling for approximately ~4 weeks.  Physical therapy is initiated immediately postsurgically with initial passive range of motion, followed by active assisted range of motion, and ultimately active range of motion after the 6 to 8-week dimple.  After the 3-month dimple postsurgically the restrictions are lifted and continued strengthening is recommended.    Miguel Angel voiced a good understanding of treatment options, risks and benefits, postoperative instructions, rehabilitation timeline, and restrictions. He was given the opportunity to ask questions, which were all answered to the best of my ability and  to his satisfaction. Miguel Angel will work with my office to arrange a time for surgery and obtain any medical clearance information necessary. My pre-operative information packet, which details the process and answers many FAQ's will be provided. He was encouraged to call the office with any further questions or concerns.  I spent 60 minutes in preparation to see the patient, counseling/education of relevant pathology, discussing imaging results, surgical counseling, DME fitting, and care coordination.      FOLLOW-UP:   Post-Operative Visit, POD 6 with Nicoller SUYAPA Coon MD  Knee, Shoulder, & Elbow Surgery / Sports Medicine Specialist  Orthopaedic Surgery  [address] [place] [address] [id number] [place] [email or web address]  [email or web address]  t: [phone]  o: [phone]  f: [phone]    This note was dictated using Dragon software.  While it was briefly proofread prior to completion, some grammatical, spelling, and word choice errors due to dictation may still occur.

## 2024-03-13 ENCOUNTER — TELEPHONE (OUTPATIENT)
Dept: ORTHOPEDICS CLINIC | Facility: CLINIC | Age: 59
End: 2024-03-13

## 2024-03-13 ENCOUNTER — TELEPHONE (OUTPATIENT)
Dept: INTERNAL MEDICINE CLINIC | Facility: CLINIC | Age: 59
End: 2024-03-13

## 2024-03-13 DIAGNOSIS — M75.122 NONTRAUMATIC COMPLETE TEAR OF LEFT ROTATOR CUFF: Primary | ICD-10-CM

## 2024-03-13 NOTE — TELEPHONE ENCOUNTER
Received pre-op clearance request from Dr. Mccann office. Pt having shoulder surgery 5-16-24. LMTCB and schedule appt. Form in blue pre-op folder awaiting callback to schedule

## 2024-03-13 NOTE — TELEPHONE ENCOUNTER
Date of Surgery:    2024    Post Op Appt:  2024 1030AM    Case ID: [id number]     Notes:             OR BOOKING SHEET SHOULDER  Location: [x] [facility]                    [x] [facility]  Name: Miguel Angel Guajardo  MRN: [id number]   : 1965  Diagnos  [x] Nontraumatic complete tear of left rotator cuff [M75.122]  Disposition:    [x] Ambulatory  [] Overnight for MARK  [] Overnight for observation and pain control  [] Inpatient procedure     Operative Time Required:  2 hours ([facility])   Antibiotics: 2 g cefazolin within 60 minutes of surgical incision  Procedure:   Laterality:                  [] RIGHT                  [x] LEFT                   [] BILATERAL  Procedures:                    [x] Shoulder Arthroscopy                                 [x] Rotator Cuff Repair (31509)  [x] Arthroscopic Biceps Tenodesis (33350)  [x] Subacromial Decompression (67863)  [x] Distal Clavicle Excision (06964)     [] SLAP repair (86274)  [] Capsulorraphy / Bankart (07447)     Additional info:   [x] PCP Clearance Needed  [] MRSA  [] C-Arm  [x] TXA at time surgery  [x] Physical Therapy External - [facility]  [x] DME Rx Needed  [] Appt with Dr. Brown needed  Implants needed: Arthrex, Daniel  Positioning Equipment: Beach Chair Setup, Jong

## 2024-03-13 NOTE — TELEPHONE ENCOUNTER
CALLED PATIENT AND WE SCHEDULED SURGERY, POST OP AND WENT OVER PRE OPERATIVE PROCEDURES, PCP CLEARANCE AND CARDIO CLEARANCE SENT. ALL QUESTIONS ANSWERED

## 2024-03-18 NOTE — TELEPHONE ENCOUNTER
Pt called to schedule Pre-op. Appt scheduled for   Future Appointments   Date Time Provider Department Center   4/29/2024  2:20 PM Stiven Mai MD [facility]       Surgery date:5/16/24  Surgeon: Dr Brown  Ph#[phone]  Fax# [phone]      Form in red folder

## 2024-03-28 ENCOUNTER — TELEPHONE (OUTPATIENT)
Dept: ORTHOPEDICS CLINIC | Facility: CLINIC | Age: 59
End: 2024-03-28

## 2024-03-28 NOTE — TELEPHONE ENCOUNTER
Patient is calling in regards to upcoming surgery with Dr. Brown.    Patient wants to know if it's okay for him to travel internationally the week before or any restrictions he may have with that.    Please advise and reach out to patient     Telephone Information:   Home Phone [phone]   Mobile [phone]

## 2024-04-09 NOTE — TELEPHONE ENCOUNTER
PATIENT CALLED ABOUT DENIED CPT CODES FOR SURGERY AND WANTED TO KNOW WHAT WE CAN DO TO GET THEM AUTHORIZED. SPOKE WITH REFERRAL SPECIALIST AND SHE STATED SHE WOULD REACH OUT TO THE PATIENT.

## 2024-04-10 NOTE — TELEPHONE ENCOUNTER
Spoke to patient and his wife regarding the denied codes, confirmed the process of authorization and if need a post review after surgery. Patient was also given [facility] phone number for deductible and payment information.

## 2024-04-24 ENCOUNTER — OFFICE VISIT (OUTPATIENT)
Dept: ORTHOPEDICS CLINIC | Facility: CLINIC | Age: 59
End: 2024-04-24
Payer: COMMERCIAL

## 2024-04-24 DIAGNOSIS — M75.122 NONTRAUMATIC COMPLETE TEAR OF LEFT ROTATOR CUFF: Primary | ICD-10-CM

## 2024-04-24 PROCEDURE — 99213 OFFICE O/P EST LOW 20 MIN: CPT | Performed by: ORTHOPAEDIC SURGERY

## 2024-04-24 NOTE — H&P
Orthopaedic Surgery  [address] [place] [id number]  [phone]        Name: Miguel Angel Guajardo   MRN: [id number]  Date: 4/24/2024     CC: Left shoulder pain and weakness in the setting of rotator cuff pathology.     HPI:   Miguel Angel Guajardo is a very pleasant 58 year old left-hand dominant male who presents today for evaluation of MRI scan of the shoulder and discussion regarding definitive management plan.     To summarize: left shoulder pain - developed recently after a cardioversion on 02/28/2024. He complains of swelling, stiffness, and numbness, and tingling. Sleeping at night is difficult. He is on eliquis.     Today, he returns for further surgical discussion after his insurance denied some codes. PT has helped but he continues to have trouble sleeping at night.    PMH:   Past Medical History:    Belching    Esophageal reflux    Heartburn    Indigestion    Shortness of breath    Wears glasses       PAST SURGICAL HX:  Past Surgical History:   Procedure Laterality Date    Cardioversion  02/28/2024    Colonoscopy  12/2022    Hernia surgery      Needle biopsy left         FAMILY HX:  Family History   Problem Relation Age of Onset    Cancer Father     Diabetes Mother     Diabetes Son     Dementia Maternal Grandmother        ALLERGIES:  Penicillins    MEDICATIONS:   Current Outpatient Medications   Medication Sig Dispense Refill    pantoprazole 40 MG Oral Tab EC TAKE ONE TABLET BY MOUTH ONCE DAILY, 30 MINUTES PRIOR TO BREAKFAST. 90 tablet 0    albuterol (2.5 MG/3ML) 0.083% Inhalation Nebu Soln inhale the contents of 1 vial ( 3 mLs ) via nebulizer every 6 hours as needed for Shortness of Breath.      dilTIAZem HCl  MG Oral Capsule SR 24 Hr Take 1 capsule (120 mg total) by mouth daily.      betamethasone valerate 0.1 % External Ointment Apply to AFFECTED AREA(S) of left knee and hands twice a day for 2-3 weeks then as needed for flares (Patient not taking: Reported on 3/11/2024)       amiodarone 200 MG Oral Tab Take 1 tablet (200 mg total) by mouth daily. 90 tablet 1    traMADol 50 MG Oral Tab Take 1-2 tablets ( mg total) by mouth every 6 (six) hours as needed for Pain. (Patient not taking: Reported on 3/11/2024) 20 tablet 0    Multiple Vitamins-Minerals (MULTIVITAMIN GUMMIES ADULTS OR) Take 2 tablets by mouth daily.      [employer] F[employer] GUMMIES OR Take 1-2 tablets by mouth daily.      Levalbuterol Tartrate (XOPENEX HFA) 45 MCG/ACT Inhalation Aerosol Inhale 2 puffs into the lungs every 4 (four) hours as needed for Wheezing. 1 each 1    dilTIAZem ER (CARDIZEM CD) 120 MG Oral Capsule SR 24 Hr Take 1 capsule (120 mg total) by mouth daily. 30 capsule 0    fluticasone propionate 110 MCG/ACT Inhalation Aerosol Inhale 2 puffs into the lungs 2 (two) times daily. 12 g 0    apixaban 5 MG Oral Tab Take 2 tabs (10mg) by mouth twice daily for 7 days, then take 1 tab (5mg) by mouth twice daily thereafter. (Patient taking differently: Take 1 tablet (5 mg total) by mouth 2 (two) times daily.) 74 tablet 0    Lidocaine 4 % External Ointment Apply 50 g topically in the morning, at noon, and at bedtime. (Patient taking differently: Apply 50 g topically as needed.) 50 g 0    fluticasone propionate 50 MCG/ACT Nasal Suspension by Each Nare route daily.      albuterol 108 (90 Base) MCG/ACT Inhalation Aero Soln Inhale 2 puffs into the lungs every 4 (four) hours as needed. (Patient not taking: Reported on 2/21/2024)      cetirizine 10 MG Oral Tab Take 1 tablet (10 mg total) by mouth daily.         ROS: A comprehensive 14 point review of systems was performed and was negative aside from the aforementioned per history of present illness.    SOCIAL HX:  Social History     Tobacco Use    Smoking status: Never    Smokeless tobacco: Never   Substance Use Topics    Alcohol use: Yes     Alcohol/week: 6.0 standard drinks of alcohol     Types: 6 Cans of beer per week       PE:   There were no vitals filed for this visit.  Estimated  body mass index is 28.12 kg/m² as calculated from the following:    Height as of 3/1/24: 6' 3\" (1.905 m).    Weight as of 3/1/24: 225 lb.    Physical Exam  Constitutional:       Appearance: Normal appearance.   HENT:      Head: Normocephalic and atraumatic.   Eyes:      Extraocular Movements: Extraocular movements intact.   Neck:      Musculoskeletal: Normal range of motion and neck supple.   Cardiovascular:      Pulses: Normal pulses.   Pulmonary:      Effort: Pulmonary effort is normal. No respiratory distress.   Abdominal:      General: There is no distension.   Skin:     General: Skin is warm.      Capillary Refill: Capillary refill takes less than 2 seconds.      Findings: No bruising.   Neurological:      General: No focal deficit present.      Mental Status: Alert.   Psychiatric:         Mood and Affect: Mood normal.     Examination of the left shoulder demonstrates:     Skin is intact, warm and dry.   Cervical:  Full ROM  Spurling's  Negative    Deformity:   none  Atrophy:   none    Scapular winging: Negative    Palpation:     AC Joint  Positive  Biceps Tendon  Positive  Greater Tuberosity Negative    ROM:   Forward Flexion:  full and symmetric  Abduction:   full and symmetric  External Rotation:  full and symmetric  Internal Rotation:  full and symmetric    Rotator Cuff Strength:   Supraspinatus:   4/5  Subscapularis:   5/5  Infraspinatus/Teres: 4/5    Provocative Tests:   Boyd:   Positive  Speed's:   Positive  Atchison's:   Positive  Lift-off:    Negative  Apprehension:  Negative  Sulcus Sign:   Negative    Neurovascular Upper Extremity (Bilateral)  Motor:    5/5 EPL, Finger Abduction, , Pinch, Deltoid  Sensation:   intact to light touch median, ulnar, radial and axillary nerve  Circulation:   Normal, 2+ radial pulse    The contralateral upper extremity is without limitation in range of motion or strength, no positive provocative maneuvers.     Radiographic Examination/Diagnostics:    XR and MRI of  the shoulder personally viewed, independently interpreted and radiology report was reviewed.    MRI SHOULDER, LEFT (CPT=73221)    Result Date: 3/8/2024  PROCEDURE:  MRI SHOULDER, LEFT (CPT=73221)  COMPARISON:  None.  INDICATIONS:  S46.002A Rotator cuff injury, left, initial encounter M75.02 Adhesive capsulitis of left shoulder  TECHNIQUE:  Multiplanar imaging of the shoulder including oblique coronal, axial and sagittal imaging was acquired including proton density fat suppression technique. Images were performed without intravenous gadolinium.  PATIENT STATED HISTORY: (As transcribed by Technologist)  Patient complains of left shoulder pain - developed recently after a cardioversion on 02/28/2024. Pain worse to lateral aspect and also complains of swelling, stiffness, and numbness, and tingling.    FINDINGS:  ROTATOR CUFF:  There is a full-thickness tear of the supraspinatus tendon with 1.4 cm of retraction.  There is mild to moderate tendinosis within the remaining tendon.  The infraspinatus tendon demonstrates mild tendinosis distally.  The subscapularis tendon is unremarkable.  There is fluid within the subacromial/subdeltoid bursa which may be due to the full-thickness tear. MUSCULATURE:  There is mild atrophy of the supraspinatus musculature. AC JOINT/ACROMION:  There is mild degenerative changes of the acromioclavicular joint with a type 1 acromion.  There is mild superior translocation of the humeral head.  The subacromial space is 6 mm. BICEPS/LABRAL COMPLEX:  Biceps tendon is intact without significant tendinosis.  The anchor and glenoid labrum are within normal limits without evidence of SLAP tear.  GLENOHUMERAL JOINT:  No significant arthritis or acute injury.  Normal marrow and cortical signal.  Unremarkable capsular insertions.  Small joint effusion without synovitis.            CONCLUSION:     1. Full-thickness tear with 1.4 cm of retraction involving the supraspinatus tendon.    2. Mild tendinosis of  the infraspinatus tendon.    3. Mild degenerative changes of the acromioclavicular joint.     LOCATION:  [facility]   Dictated by (CST): Christophe Melchor MD on 3/08/2024 at 3:27 PM     Finalized by (CST): Christophe Melchor MD on 3/08/2024 at 3:31 PM       XR SHOULDER, COMPLETE (MIN 2 VIEWS), LEFT (CPT=73030)    Result Date: 3/1/2024  PROCEDURE:  XR SHOULDER, COMPLETE (MIN 2 VIEWS), LEFT (CPT=73030)  TECHNIQUE:  Multiple views were obtained.  COMPARISON:  None.  INDICATIONS:  R52 Pain, shoulder pain  PATIENT STATED HISTORY: (As transcribed by Technologist)  Patient is having an orthopedic evaluation.  Patient stated he had a cardiac procedure done 2 days ago and afterwards he started having Left shoulder pain that radiates down his Left arm.  Patient  denies injury to his Left shoulder.   FINDINGS:  No evidence of acute displaced fracture or dislocation.  Normal mineralization.  Unremarkable soft tissues.  Mild osteoarthritic changes in the left acromioclavicular joint.            CONCLUSION:  No evidence of acute displaced fracture or dislocation in the left shoulder.  LOCATION:  [facility]   Dictated by (CST): Alexx Brown MD on 3/01/2024 at 7:32 AM     Finalized by (CST): Alexx Brown MD on 3/01/2024 at 7:33 AM         IMPRESSION: Miguel Angel Guajardo is a 58 year old male with Left shoulder full thickness supraspinatus tear, subacromial impingement, AC joint arthropathy and biceps tendinitis sustained 02/28/2024 suspected after a cardioversion.    The patient has failed an appropriate course of nonsurgical conservative management and is a candidate for arthroscopic rotator cuff repair, subacromial decompression, biceps tenodesis, and distal clavicle excision.    PLAN:   We had a detailed discussion outlining the etiology, anatomy, pathophysiology, and natural history of rotator cuff pathology of the shoulder. Imaging was reviewed in detail and correlated to a 3-dimensional model of the shoulder.     I had a lengthy  discussion with Miguel Angel about the diagnosis and options.  I provided reassurance that we will treat him appropriately and achieve surgical success.    FOLLOW-UP:   Post-Operative Visit, POD 6 with Sincer SUYAPA Coon MD  Knee, Shoulder, & Elbow Surgery / Sports Medicine Specialist  Orthopaedic Surgery  [address] [place] [address] [id number] [place] [email or web address]  [email or web address]  t: [phone]  o: [phone]  f: [phone]    This note was dictated using Dragon software.  While it was briefly proofread prior to completion, some grammatical, spelling, and word choice errors due to dictation may still occur.

## 2024-04-26 ENCOUNTER — TELEPHONE (OUTPATIENT)
Dept: INTERNAL MEDICINE CLINIC | Facility: CLINIC | Age: 59
End: 2024-04-26

## 2024-04-26 NOTE — TELEPHONE ENCOUNTER
Stress test results received from [facility], ordered by Dr. Pineda. Put in AD bin for review.

## 2024-04-29 ENCOUNTER — OFFICE VISIT (OUTPATIENT)
Dept: INTERNAL MEDICINE CLINIC | Facility: CLINIC | Age: 59
End: 2024-04-29
Payer: COMMERCIAL

## 2024-04-29 VITALS
DIASTOLIC BLOOD PRESSURE: 78 MMHG | WEIGHT: 234 LBS | SYSTOLIC BLOOD PRESSURE: 114 MMHG | RESPIRATION RATE: 20 BRPM | HEART RATE: 80 BPM | TEMPERATURE: 97 F | OXYGEN SATURATION: 96 % | HEIGHT: 76 IN | BODY MASS INDEX: 28.49 KG/M2

## 2024-04-29 DIAGNOSIS — I48.91 ATRIAL FIBRILLATION, UNSPECIFIED TYPE (HCC): ICD-10-CM

## 2024-04-29 DIAGNOSIS — M75.122 NONTRAUMATIC COMPLETE TEAR OF LEFT ROTATOR CUFF: ICD-10-CM

## 2024-04-29 DIAGNOSIS — Z01.818 PREOP EXAMINATION: Primary | ICD-10-CM

## 2024-04-29 NOTE — PROGRESS NOTES
[facility]  PRE OP RISK ASSESSMENT    REASON FOR CONSULT: Pre-op risk assessment for surgical procedure: left shoulder arthroscopy rotator cuff repair arthroscopic biceps tenodesis subacromial decompression distal clavicle excision planned for 5/16/2024 with: general anesthesia.    REQUESTING PHYSICIAN: MD Kevin    CHIEF COMPLAINT:   Chief Complaint   Patient presents with    Pre-Op     Rm 7       HISTORY OF PRESENT ILLNESS:   The patient is a 58 year old male who presents for preoperative evaluation.    The patient has a history of left shoulder pain and reduced ROM secondary to complete left rotator cuff tendon tear. For this he is scheduled for the above procedure.    No acute concerns.     Rate-controlled and asymptomatic from atrial fibrillation. Compliant with diltiazem, amiodarone, and apixaban.     Past Medical History:    Past Medical History:    Belching    Esophageal reflux    Heartburn    Indigestion    Shortness of breath    Wears glasses        Past Surgical History:    Past Surgical History:   Procedure Laterality Date    Cardioversion  02/28/2024    Colonoscopy  12/2022    Hernia surgery      Needle biopsy left         Current Medications:    Current Outpatient Medications   Medication Sig Dispense Refill    pantoprazole 40 MG Oral Tab EC TAKE ONE TABLET BY MOUTH ONCE DAILY, 30 MINUTES PRIOR TO BREAKFAST. 90 tablet 0    albuterol (2.5 MG/3ML) 0.083% Inhalation Nebu Soln inhale the contents of 1 vial ( 3 mLs ) via nebulizer every 6 hours as needed for Shortness of Breath.      amiodarone 200 MG Oral Tab Take 1 tablet (200 mg total) by mouth daily. 90 tablet 1    Multiple Vitamins-Minerals (MULTIVITAMIN GUMMIES ADULTS OR) Take 2 tablets by mouth daily.      [employer] FIBER GUMMIES OR Take 1-2 tablets by mouth daily.      dilTIAZem ER (CARDIZEM CD) 120 MG Oral Capsule SR 24 Hr Take 1 capsule (120 mg total) by mouth daily. 30 capsule 0    apixaban 5 MG Oral Tab Take 2 tabs (10mg) by mouth twice  daily for 7 days, then take 1 tab (5mg) by mouth twice daily thereafter. (Patient taking differently: take 1 tab (5mg) by mouth twice daily thereafter.) 74 tablet 0    albuterol 108 (90 Base) MCG/ACT Inhalation Aero Soln Inhale 2 puffs into the lungs every 4 (four) hours as needed.      cetirizine 10 MG Oral Tab Take 1 tablet (10 mg total) by mouth daily.      dilTIAZem HCl  MG Oral Capsule SR 24 Hr Take 1 capsule (120 mg total) by mouth daily. (Patient not taking: Reported on 4/29/2024)      betamethasone valerate 0.1 % External Ointment  (Patient not taking: Reported on 4/29/2024)      traMADol 50 MG Oral Tab Take 1-2 tablets ( mg total) by mouth every 6 (six) hours as needed for Pain. (Patient not taking: Reported on 3/11/2024) 20 tablet 0    Levalbuterol Tartrate (XOPENEX HFA) 45 MCG/ACT Inhalation Aerosol Inhale 2 puffs into the lungs every 4 (four) hours as needed for Wheezing. (Patient not taking: Reported on 4/29/2024) 1 each 1    fluticasone propionate 110 MCG/ACT Inhalation Aerosol Inhale 2 puffs into the lungs 2 (two) times daily. 12 g 0    Lidocaine 4 % External Ointment Apply 50 g topically in the morning, at noon, and at bedtime. (Patient not taking: Reported on 4/29/2024) 50 g 0    fluticasone propionate 50 MCG/ACT Nasal Suspension by Each Nare route daily. (Patient not taking: Reported on 4/29/2024)          Allergies:    Allergies as of 04/29/2024 - Review Complete 04/29/2024   Allergen Reaction Noted    Penicillins OTHER (SEE COMMENTS) and ANAPHYLAXIS 09/04/2002       SOCIAL HISTORY:   Social History     Socioeconomic History    Marital status:      Spouse name: Not on file    Number of children: Not on file    Years of education: Not on file    Highest education level: Not on file   Occupational History    Not on file   Tobacco Use    Smoking status: Never    Smokeless tobacco: Never   Vaping Use    Vaping status: Never Used   Substance and Sexual Activity    Alcohol use: Yes      Alcohol/week: 6.0 standard drinks of alcohol     Types: 6 Cans of beer per week    Drug use: Never    Sexual activity: Not on file   Other Topics Concern    Caffeine Concern Not Asked    Exercise Not Asked    Seat Belt Not Asked    Special Diet Not Asked    Stress Concern Not Asked    Weight Concern Yes   Social History Narrative    Not on file     Social Determinants of Health     Financial Resource Strain: Not on file   Food Insecurity: No Food Insecurity (12/29/2023)    [email or web address]    Hunger Screening     Within the past 12 months we worried whether our food would run out before we got money to buy more.: Never True     Within the past 12 months the food we bought just didn't last and we didn't have money to get more.: Never True   Transportation Needs: Not on file   Physical Activity: Not on file   Stress: Not on file   Social Connections: Not on file   Housing Stability: Not on file        FAMILY HISTORY:   Family History   Problem Relation Age of Onset    Cancer Father     Diabetes Mother     Diabetes Son     Dementia Maternal Grandmother         REVIEW OF SYSTEMS:    GENERAL: feels well otherwise  SKIN: denies any unusual skin lesions  HEENT: denies blurred vision or double vision denies nasal congestion  LUNGS: denies shortness of breath with exertion  CARDIOVASCULAR: denies chest pain on exertion  GI: denies abdominal pain, denies heartburn  : denies dysuria, urgency, frequency, hematuria  MUSCULOSKELETAL: denies back pain  NEURO: denies headaches    PRE-OP ROS  NSAIDS/PLATELET INH: No antiplatelet. Taking apixaban.  DIABETIC MEDICATIONS: No  MARK: No  Hx of VTE: No  BLEEDING DISORDER: No  LOOSE DENTITION OR DENTAL APPLIANCES: No  URI, COUGH, CP, FEVER: No  CERVICAL SPINE RESTRICTION: No known. No history of RA.    PHYSICAL EXAM:  /78 (BP Location: Right arm, Patient Position: Sitting, Cuff Size: adult)   Pulse 80   Temp 97.4 °F (36.3 °C) (Skin)    Resp 20   Ht 6' 4\" (1.93 m)   Wt 234 lb (106.1 kg)   SpO2 96%   BMI 28.48 kg/m²   GENERAL: Well developed, well nourished,in no apparent distress  SKIN: No rashes,no suspicious lesions  EYES: bilateral conjunctiva are clear  HEENT: atraumatic, normocephalic  NECK: supple,no adenopathy,no bruits  LUNGS: clear to auscultation  CARDIO: RRR without murmur  GI: good BS's,no masses, HSM or tenderness    LABS:  None requested or pending    ASSESSMENT AND PLAN:    1. Preop examination  The patient is at acceptable risk of complications for the planned procedure  Scheduled for cardiac clearance tomorrow  Able to achieve at least 4 METS    2. Nontraumatic complete tear of left rotator cuff    3. Atrial fibrillation, unspecified type (HCC)  Rate-controlled and asymptomatic   Compliant with diltiazem, amiodarone, and apixaban  Anticoagulation recommendations per cardiology service    Encounter Diagnoses   Name Primary?    Preop examination Yes    Nontraumatic complete tear of left rotator cuff     Atrial fibrillation, unspecified type (HCC)        No orders of the defined types were placed in this encounter.      Imaging & Consults:  None    Stiven Mai MD

## 2024-05-06 ENCOUNTER — HOSPITAL ENCOUNTER (OUTPATIENT)
Dept: ULTRASOUND IMAGING | Age: 59
Discharge: HOME | End: 2024-05-06
Attending: STUDENT IN AN ORGANIZED HEALTH CARE EDUCATION/TRAINING PROGRAM
Payer: COMMERCIAL

## 2024-05-06 DIAGNOSIS — K76.0 FATTY LIVER: ICD-10-CM

## 2024-05-06 DIAGNOSIS — K76.89 LIVER CYST: ICD-10-CM

## 2024-05-06 PROCEDURE — 76705 ECHO EXAM OF ABDOMEN: CPT | Performed by: STUDENT IN AN ORGANIZED HEALTH CARE EDUCATION/TRAINING PROGRAM

## 2024-05-08 PROBLEM — R07.89 ATYPICAL CHEST PAIN: Status: ACTIVE | Noted: 2024-03-07

## 2024-05-08 PROBLEM — I48.91 ATRIAL FIBRILLATION (HCC): Status: ACTIVE | Noted: 2024-01-22

## 2024-05-08 PROBLEM — I34.0 MILD MITRAL VALVE REGURGITATION: Status: ACTIVE | Noted: 2024-01-22

## 2024-05-08 PROBLEM — R06.09 CHRONIC DYSPNEA: Status: ACTIVE | Noted: 2024-01-22

## 2024-05-08 NOTE — PROGRESS NOTES
RUQ US 1 year.     ---    Raza Michelle,    Your recent ultrasound showed a fatty liver.  This is where extra fat deposits within the liver. However, if fatty liver is left untreated, this can potentially lead to other liver and heart issues and conditions.     I recommend that you follow a low fat and low calorie diet, with a diet high in fruits and vegetables. I also recommend that you lose about 8-10% of your body weight with diet and exercise in the next 6-12 months, to help minimize the risk of fatty liver.     In addition, this ultrasound shows liver cysts. Please obtain a repeat ultrasound in 1 year for surveillance.     Please let me know if you have any questions or concerns.     Sincerely,  Cory Cartagena MD

## 2024-05-15 DIAGNOSIS — Z98.890 S/P ARTHROSCOPY OF SHOULDER: Primary | ICD-10-CM

## 2024-05-15 RX ORDER — TRAMADOL HYDROCHLORIDE 50 MG/1
TABLET ORAL
Qty: 20 TABLET | Refills: 1 | Status: SHIPPED | OUTPATIENT
Start: 2024-05-15

## 2024-05-15 RX ORDER — ONDANSETRON 4 MG/1
TABLET, FILM COATED ORAL
Qty: 8 TABLET | Refills: 0 | Status: SHIPPED | OUTPATIENT
Start: 2024-05-15

## 2024-05-22 ENCOUNTER — OFFICE VISIT (OUTPATIENT)
Dept: ORTHOPEDICS CLINIC | Facility: CLINIC | Age: 59
End: 2024-05-22

## 2024-05-22 VITALS — BODY MASS INDEX: 28.23 KG/M2 | HEIGHT: 75 IN | WEIGHT: 227 LBS

## 2024-05-22 DIAGNOSIS — Z98.890 S/P ARTHROSCOPY OF SHOULDER: Primary | ICD-10-CM

## 2024-05-22 PROCEDURE — 99024 POSTOP FOLLOW-UP VISIT: CPT | Performed by: PHYSICIAN ASSISTANT

## 2024-05-22 PROCEDURE — 3008F BODY MASS INDEX DOCD: CPT | Performed by: PHYSICIAN ASSISTANT

## 2024-05-22 RX ORDER — TRAMADOL HYDROCHLORIDE 50 MG/1
TABLET ORAL
Qty: 20 TABLET | Refills: 1 | Status: SHIPPED | OUTPATIENT
Start: 2024-05-22

## 2024-05-22 NOTE — PROGRESS NOTES
[facility] ORTHOPEDICS  [address] [place] [id number]  [phone]       Name: Miguel Angel Guajardo   MRN: [id number]  Date: 5/22/2024     REASON FOR VISIT: First Post-Surgical Visit   Surgery: Left rotator cuff repair, biceps tenodesis, subacromial decompression, extensive debridement on May 16, 2024.     INTERVAL HISTORY:  Miguel Angel Guajardo is a 58 year old male who returns after the aforementioned procedure.  The post-operative course has been unremarkable with pain well controlled and overall progress noted.     Physical therapy was started and is progressing well.  The patient denies any calf pain or tenderness, fevers, chills, sweats or signs of active infection. The patient has been compliant with the postoperative protocol, and admits to normal bowel and bladder function. No acute issues.     ROS: ROS    PE:   Vitals:    05/22/24 1045   Weight: 227 lb (103 kg)   Height: 6' 3\" (1.905 m)     Estimated body mass index is 28.37 kg/m² as calculated from the following:    Height as of this encounter: 6' 3\" (1.905 m).    Weight as of this encounter: 227 lb (103 kg).    Physical Exam  Constitutional:       Appearance: Normal appearance.   HENT:      Head: Normocephalic and atraumatic.   Eyes:      Extraocular Movements: Extraocular movements intact.   Neck:      Musculoskeletal: Normal range of motion and neck supple.   Cardiovascular:      Pulses: Normal pulses.   Pulmonary:      Effort: Pulmonary effort is normal. No respiratory distress.   Abdominal:      General: There is no distension.   Skin:     General: Skin is warm.      Capillary Refill: Capillary refill takes less than 2 seconds.      Findings: No bruising.   Neurological:      General: No focal deficit present.      Mental Status: She is alert.   Psychiatric:         Mood and Affect: Mood normal.     Examination of the left shoulder demonstrates:     Physical examination the patient is alert and oriented x3,  well-developed, well-nourished, no acute distress.     Tegaderm dressings were removed, and Steri-Strips were maintained and kept in place.    Incisional sites are clean dry intact without signs of active pathology.      The contralateral shoulder is without limitation in range of motion or strength, no positive provocative maneuvers.     IMPRESSION: Miguel Angel Guajardo is a 58 year old male who presents 6 days s/p Left rotator cuff repair, biceps tenodesis, subacromial decompression, extensive debridement on May 16, 2024.     PLAN:   We had a lengthy discussion with the patient regarding the patient's findings consistent with the expected postoperative course. We recommend continuation of physical therapy with rehabilitation efforts focused on strengthening, range of motion, functional ability, and return to baseline activity. The patient can continue to progress per protocol.    All questions were answered appropriately and the patient was in agreement with the treatment plan.       FOLLOW-UP:  Return to clinic in four weeks. No imaging required at next visit.             Silver Brantley [facility], PA-C Orthopedic Surgery / Sports Medicine Specialist  [facility] Orthopaedic Surgery  [address] [place] [address].org  [email or web address]  t: [phone]  o: [phone]  f: [phone]    This note was dictated using Dragon software.  While it was briefly proofread prior to completion, some grammatical, spelling, and word choice errors due to dictation may still occur.

## 2024-06-19 ENCOUNTER — OFFICE VISIT (OUTPATIENT)
Dept: ORTHOPEDICS CLINIC | Facility: CLINIC | Age: 59
End: 2024-06-19

## 2024-06-19 DIAGNOSIS — Z98.890 S/P ARTHROSCOPY OF SHOULDER: Primary | ICD-10-CM

## 2024-06-19 PROCEDURE — 99024 POSTOP FOLLOW-UP VISIT: CPT | Performed by: PHYSICIAN ASSISTANT

## 2024-06-19 NOTE — PROGRESS NOTES
[facility] ORTHOPEDICS  [address] [place] [address]  [phone]       Name: Miguel Angel Guajardo   MRN: [id number]  Date: 6/19/2024     REASON FOR VISIT: Second Post-Surgical Visit   Surgery: Left rotator cuff repair, biceps tenodesis, subacromial decompression, extensive debridement on May 16, 2024.     INTERVAL HISTORY:  Miguel Angel Guajardo is a 59 year old male who returns after the aforementioned procedure.  The post-operative course has been unremarkable with pain well controlled and overall progress noted.     Physical therapy was started and is progressing well.  Working with [facility]. 1-2/10 pain.     ROS: ROS    PE:   There were no vitals filed for this visit.  Estimated body mass index is 28.37 kg/m² as calculated from the following:    Height as of 5/22/24: 6' 3\" (1.905 m).    Weight as of 5/22/24: 227 lb (103 kg).    Physical Exam  Constitutional:       Appearance: Normal appearance.   HENT:      Head: Normocephalic and atraumatic.   Eyes:      Extraocular Movements: Extraocular movements intact.   Neck:      Musculoskeletal: Normal range of motion and neck supple.   Cardiovascular:      Pulses: Normal pulses.   Pulmonary:      Effort: Pulmonary effort is normal. No respiratory distress.   Abdominal:      General: There is no distension.   Skin:     General: Skin is warm.      Capillary Refill: Capillary refill takes less than 2 seconds.      Findings: No bruising.   Neurological:      General: No focal deficit present.      Mental Status: She is alert.   Psychiatric:         Mood and Affect: Mood normal.     Examination of the left shoulder demonstrates:     Physical examination the patient is alert and oriented x3, well-developed, well-nourished, no acute distress.     90/35/L5.     Incisional sites are clean dry intact without signs of active pathology.      The contralateral shoulder is without limitation in range of motion or strength, no positive provocative  maneuvers.     IMPRESSION: Miguel Angel Guajardo is a 59 year old male who presents 5 weeks s/p Left rotator cuff repair, biceps tenodesis, subacromial decompression, extensive debridement on May 16, 2024.     PLAN:   We had a lengthy discussion with the patient regarding the patient's findings consistent with the expected postoperative course. We recommend continuation of physical therapy with rehabilitation efforts focused on strengthening, range of motion, functional ability, and return to baseline activity. The patient can continue to progress per protocol.    All questions were answered appropriately and the patient was in agreement with the treatment plan.       FOLLOW-UP:  Return to clinic in eight weeks. No imaging required at next visit.             Sincer ROOSEVELT Brantley, [facility], PA-C Orthopedic Surgery / Sports Medicine Specialist  [facility]  [address] [place] [address].org  [email or web address]  t: [phone]  o: [phone]  f: [phone]    This note was dictated using Dragon software.  While it was briefly proofread prior to completion, some grammatical, spelling, and word choice errors due to dictation may still occur.

## 2024-06-28 PROBLEM — K20.90: Status: ACTIVE | Noted: 2022-12-16

## 2024-06-28 PROBLEM — J32.9 RECURRENT SINUSITIS: Status: ACTIVE | Noted: 2024-01-22

## 2024-06-28 PROBLEM — R53.83 FATIGUE: Status: ACTIVE | Noted: 2024-01-22

## 2024-08-20 ENCOUNTER — OFFICE VISIT (OUTPATIENT)
Dept: ORTHOPEDICS CLINIC | Facility: CLINIC | Age: 59
End: 2024-08-20
Payer: COMMERCIAL

## 2024-08-20 DIAGNOSIS — Z98.890 S/P ARTHROSCOPY OF SHOULDER: Primary | ICD-10-CM

## 2024-08-20 PROCEDURE — 99213 OFFICE O/P EST LOW 20 MIN: CPT | Performed by: PHYSICIAN ASSISTANT

## 2024-08-20 NOTE — PROGRESS NOTES
[facility] ORTHOPEDICS  [address] [place] [address]  [phone]       Name: Miguel Angel Guajardo   MRN: [id number]  Date: 8/20/2024     REASON FOR VISIT: Third Post-Surgical Visit   Surgery: Left rotator cuff repair, biceps tenodesis, subacromial decompression, extensive debridement on May 16, 2024.     INTERVAL HISTORY:  Miguel Angel Guajardo is a 59 year old male who returns after the aforementioned procedure.  The post-operative course has been unremarkable with pain well controlled and overall progress noted.     Physical therapy was started and is progressing well.  Working with [email or web address] PT. 1-2/10 pain.     ROS: ROS    PE:   There were no vitals filed for this visit.  Estimated body mass index is 28.5 kg/m² as calculated from the following:    Height as of 6/28/24: 6' 3\" (1.905 m).    Weight as of 6/28/24: 228 lb (103.4 kg).    Physical Exam  Constitutional:       Appearance: Normal appearance.   HENT:      Head: Normocephalic and atraumatic.   Eyes:      Extraocular Movements: Extraocular movements intact.   Neck:      Musculoskeletal: Normal range of motion and neck supple.   Cardiovascular:      Pulses: Normal pulses.   Pulmonary:      Effort: Pulmonary effort is normal. No respiratory distress.   Abdominal:      General: There is no distension.   Skin:     General: Skin is warm.      Capillary Refill: Capillary refill takes less than 2 seconds.      Findings: No bruising.   Neurological:      General: No focal deficit present.      Mental Status: She is alert.   Psychiatric:         Mood and Affect: Mood normal.     Examination of the left shoulder demonstrates:     Physical examination the patient is alert and oriented x3, well-developed, well-nourished, no acute distress.     150, vs 155 contralateral, 40 of ER, and IR to L5. 5- strength.     Incisional sites are clean dry intact without signs of active pathology.      The contralateral shoulder is without limitation in  range of motion or strength, no positive provocative maneuvers.     IMPRESSION: Miguel Angel Guajardo is a 59 year old male who presents 3 months s/p Left rotator cuff repair, biceps tenodesis, subacromial decompression, extensive debridement on May 16, 2024.     PLAN:   We had a lengthy discussion with the patient regarding the patient's findings consistent with the expected postoperative course. We recommend continuation of physical therapy with rehabilitation efforts focused on strengthening, range of motion, functional ability, and return to baseline activity. The patient can continue to progress per protocol.    All questions were answered appropriately and the patient was in agreement with the treatment plan.       FOLLOW-UP:  3 months, no imaging needed.             Nicoller ROOSEVELT Brantley [facility], PA-C Orthopedic Surgery / Sports Medicine Specialist  [facility]  [address] [place] [address].org  [email or web address]  t: [phone]  o: [phone]  f: [phone]    This note was dictated using Dragon software.  While it was briefly proofread prior to completion, some grammatical, spelling, and word choice errors due to dictation may still occur.

## 2024-11-19 DIAGNOSIS — Z00.00 ROUTINE GENERAL MEDICAL EXAMINATION AT A HEALTH CARE FACILITY: Primary | ICD-10-CM

## 2024-11-19 DIAGNOSIS — Z13.0 SCREENING FOR BLOOD DISEASE: ICD-10-CM

## 2024-11-19 DIAGNOSIS — R73.03 PREDIABETES: ICD-10-CM

## 2024-11-19 DIAGNOSIS — Z13.228 SCREENING FOR METABOLIC DISORDER: ICD-10-CM

## 2024-11-19 DIAGNOSIS — Z13.29 SCREENING FOR THYROID DISORDER: ICD-10-CM

## 2024-11-19 DIAGNOSIS — Z13.220 SCREENING FOR LIPID DISORDERS: ICD-10-CM

## 2024-11-19 DIAGNOSIS — Z12.5 SCREENING FOR PROSTATE CANCER: ICD-10-CM

## 2024-11-20 ENCOUNTER — OFFICE VISIT (OUTPATIENT)
Dept: ORTHOPEDICS CLINIC | Facility: CLINIC | Age: 59
End: 2024-11-20
Payer: COMMERCIAL

## 2024-11-20 DIAGNOSIS — Z98.890 S/P ARTHROSCOPY OF SHOULDER: Primary | ICD-10-CM

## 2024-11-20 PROCEDURE — 99213 OFFICE O/P EST LOW 20 MIN: CPT | Performed by: ORTHOPAEDIC SURGERY

## 2024-11-20 NOTE — PROGRESS NOTES
[facility] ORTHOPEDICS  [address] [place] [address]  [phone]       Name: Miguel Angel Guajardo   MRN: [id number]  Date: 11/20/2024     REASON FOR VISIT: Fourth Post-Surgical Visit   Surgery: Left rotator cuff repair, biceps tenodesis, subacromial decompression, extensive debridement on May 16, 2024.     INTERVAL HISTORY:  Miguel Angel Guajardo is a 59 year old male who returns after the aforementioned procedure.  The post-operative course has been unremarkable with pain well controlled and overall progress noted.     Physical therapy was started and is progressing well.  Working with [facility]. 0/10 pain. Overall, he is very happy with his progress.      PE:   There were no vitals filed for this visit.  Estimated body mass index is 28.5 kg/m² as calculated from the following:    Height as of 6/28/24: 6' 3\" (1.905 m).    Weight as of 6/28/24: 228 lb.    Physical Exam  Constitutional:       Appearance: Normal appearance.   HENT:      Head: Normocephalic and atraumatic.   Eyes:      Extraocular Movements: Extraocular movements intact.   Neck:      Musculoskeletal: Normal range of motion and neck supple.   Cardiovascular:      Pulses: Normal pulses.   Pulmonary:      Effort: Pulmonary effort is normal. No respiratory distress.   Abdominal:      General: There is no distension.   Skin:     General: Skin is warm.      Capillary Refill: Capillary refill takes less than 2 seconds.      Findings: No bruising.   Neurological:      General: No focal deficit present.      Mental Status: She is alert.   Psychiatric:         Mood and Affect: Mood normal.     Examination of the left shoulder demonstrates:     Physical examination the patient is alert and oriented x3, well-developed, well-nourished, no acute distress.     Full symmetric ROM with 5 degrees of ROM loss, 5/5 strength.     Incisional sites are clean dry intact without signs of active pathology.      The contralateral shoulder is  without limitation in range of motion or strength, no positive provocative maneuvers.       IMPRESSION: Miguel Angel Guajardo is a 59 year old male who presents 6 months s/p Left rotator cuff repair, biceps tenodesis, subacromial decompression, extensive debridement on May 16, 2024.     PLAN:   We had a lengthy discussion with the patient regarding the patient's findings consistent with the expected postoperative course. We recommend continuation of physical therapy with rehabilitation efforts focused on strengthening, range of motion, functional ability, and return to baseline activity. The patient can continue to progress per protocol.    All questions were answered appropriately and the patient was in agreement with the treatment plan.       FOLLOW-UP:  As needed, no imaging needed.         Danyell Brown MD  Knee, Shoulder, & Elbow Surgery / Sports Medicine Specialist  Orthopaedic Surgery  [address] [place] [address].org  [email or web address]  t: [phone]  o: [phone]  f: [phone]     This note was dictated using Dragon software.  While it was briefly proofread prior to completion, some grammatical, spelling, and word choice errors due to dictation may still occur.

## 2024-12-31 ENCOUNTER — LAB ENCOUNTER (OUTPATIENT)
Dept: LAB | Age: 59
End: 2024-12-31
Attending: INTERNAL MEDICINE
Payer: COMMERCIAL

## 2024-12-31 DIAGNOSIS — Z13.0 SCREENING FOR BLOOD DISEASE: ICD-10-CM

## 2024-12-31 DIAGNOSIS — Z00.00 ROUTINE GENERAL MEDICAL EXAMINATION AT A HEALTH CARE FACILITY: ICD-10-CM

## 2024-12-31 DIAGNOSIS — R73.03 PREDIABETES: ICD-10-CM

## 2024-12-31 DIAGNOSIS — Z13.220 SCREENING FOR LIPID DISORDERS: ICD-10-CM

## 2024-12-31 DIAGNOSIS — R73.01 ELEVATED FASTING BLOOD SUGAR: ICD-10-CM

## 2024-12-31 DIAGNOSIS — Z12.5 SCREENING FOR PROSTATE CANCER: ICD-10-CM

## 2024-12-31 DIAGNOSIS — Z13.228 SCREENING FOR METABOLIC DISORDER: ICD-10-CM

## 2024-12-31 DIAGNOSIS — Z13.29 SCREENING FOR THYROID DISORDER: ICD-10-CM

## 2024-12-31 LAB
ALBUMIN SERPL-MCNC: 4.7 G/DL (ref 3.2–4.8)
ALBUMIN/GLOB SERPL: 2 {RATIO} (ref 1–2)
ALP LIVER SERPL-CCNC: 53 U/L
ALT SERPL-CCNC: 84 U/L
ANION GAP SERPL CALC-SCNC: 7 MMOL/L (ref 0–18)
AST SERPL-CCNC: 54 U/L (ref ?–34)
BASOPHILS # BLD AUTO: 0.03 X10(3) UL (ref 0–0.2)
BASOPHILS NFR BLD AUTO: 0.4 %
BILIRUB SERPL-MCNC: 0.5 MG/DL (ref 0.3–1.2)
BUN BLD-MCNC: 16 MG/DL (ref 9–23)
CALCIUM BLD-MCNC: 9.4 MG/DL (ref 8.7–10.4)
CHLORIDE SERPL-SCNC: 105 MMOL/L (ref 98–112)
CHOLEST SERPL-MCNC: 105 MG/DL (ref ?–200)
CO2 SERPL-SCNC: 28 MMOL/L (ref 21–32)
COMPLEXED PSA SERPL-MCNC: 0.28 NG/ML (ref ?–4)
CREAT BLD-MCNC: 0.81 MG/DL
EGFRCR SERPLBLD CKD-EPI 2021: 103 ML/MIN/1.73M2 (ref 60–?)
EOSINOPHIL # BLD AUTO: 0.35 X10(3) UL (ref 0–0.7)
EOSINOPHIL NFR BLD AUTO: 4.8 %
ERYTHROCYTE [DISTWIDTH] IN BLOOD BY AUTOMATED COUNT: 13.4 %
EST. AVERAGE GLUCOSE BLD GHB EST-MCNC: 131 MG/DL (ref 68–126)
FASTING PATIENT LIPID ANSWER: YES
FASTING STATUS PATIENT QL REPORTED: YES
GLOBULIN PLAS-MCNC: 2.4 G/DL (ref 2–3.5)
GLUCOSE BLD-MCNC: 130 MG/DL (ref 70–99)
HBA1C MFR BLD: 6.2 % (ref ?–5.7)
HCT VFR BLD AUTO: 44.7 %
HDLC SERPL-MCNC: 40 MG/DL (ref 40–59)
HGB BLD-MCNC: 14.9 G/DL
IMM GRANULOCYTES # BLD AUTO: 0.03 X10(3) UL (ref 0–1)
IMM GRANULOCYTES NFR BLD: 0.4 %
LDLC SERPL CALC-MCNC: 49 MG/DL (ref ?–100)
LYMPHOCYTES # BLD AUTO: 2.02 X10(3) UL (ref 1–4)
LYMPHOCYTES NFR BLD AUTO: 27.6 %
MCH RBC QN AUTO: 29 PG (ref 26–34)
MCHC RBC AUTO-ENTMCNC: 33.3 G/DL (ref 31–37)
MCV RBC AUTO: 87 FL
MONOCYTES # BLD AUTO: 0.54 X10(3) UL (ref 0.1–1)
MONOCYTES NFR BLD AUTO: 7.4 %
NEUTROPHILS # BLD AUTO: 4.35 X10 (3) UL (ref 1.5–7.7)
NEUTROPHILS # BLD AUTO: 4.35 X10(3) UL (ref 1.5–7.7)
NEUTROPHILS NFR BLD AUTO: 59.4 %
NONHDLC SERPL-MCNC: 65 MG/DL (ref ?–130)
OSMOLALITY SERPL CALC.SUM OF ELEC: 293 MOSM/KG (ref 275–295)
PLATELET # BLD AUTO: 301 10(3)UL (ref 150–450)
POTASSIUM SERPL-SCNC: 4 MMOL/L (ref 3.5–5.1)
PROT SERPL-MCNC: 7.1 G/DL (ref 5.7–8.2)
RBC # BLD AUTO: 5.14 X10(6)UL
SODIUM SERPL-SCNC: 140 MMOL/L (ref 136–145)
TRIGL SERPL-MCNC: 79 MG/DL (ref 30–149)
TSI SER-ACNC: 1.26 UIU/ML (ref 0.55–4.78)
VLDLC SERPL CALC-MCNC: 11 MG/DL (ref 0–30)
WBC # BLD AUTO: 7.3 X10(3) UL (ref 4–11)

## 2024-12-31 PROCEDURE — 80061 LIPID PANEL: CPT | Performed by: INTERNAL MEDICINE

## 2024-12-31 PROCEDURE — 80050 GENERAL HEALTH PANEL: CPT | Performed by: INTERNAL MEDICINE

## 2024-12-31 PROCEDURE — 84153 ASSAY OF PSA TOTAL: CPT | Performed by: INTERNAL MEDICINE

## 2024-12-31 PROCEDURE — 83036 HEMOGLOBIN GLYCOSYLATED A1C: CPT | Performed by: INTERNAL MEDICINE

## 2025-01-20 ENCOUNTER — TELEPHONE (OUTPATIENT)
Dept: ORTHOPEDICS CLINIC | Facility: CLINIC | Age: 60
End: 2025-01-20

## 2025-01-20 DIAGNOSIS — M25.561 RIGHT KNEE PAIN, UNSPECIFIED CHRONICITY: ICD-10-CM

## 2025-01-20 DIAGNOSIS — Z01.89 ENCOUNTER FOR LOWER EXTREMITY COMPARISON IMAGING STUDY: Primary | ICD-10-CM

## 2025-01-21 ENCOUNTER — OFFICE VISIT (OUTPATIENT)
Dept: ORTHOPEDICS CLINIC | Facility: CLINIC | Age: 60
End: 2025-01-21
Payer: COMMERCIAL

## 2025-01-21 ENCOUNTER — HOSPITAL ENCOUNTER (OUTPATIENT)
Dept: GENERAL RADIOLOGY | Age: 60
Discharge: HOME | End: 2025-01-21
Attending: PHYSICIAN ASSISTANT
Payer: COMMERCIAL

## 2025-01-21 VITALS — HEIGHT: 75 IN | BODY MASS INDEX: 28.35 KG/M2 | WEIGHT: 228 LBS

## 2025-01-21 DIAGNOSIS — M23.41 LOOSE BODY OF RIGHT KNEE: ICD-10-CM

## 2025-01-21 DIAGNOSIS — Z01.89 ENCOUNTER FOR LOWER EXTREMITY COMPARISON IMAGING STUDY: ICD-10-CM

## 2025-01-21 DIAGNOSIS — M25.561 RIGHT KNEE PAIN, UNSPECIFIED CHRONICITY: ICD-10-CM

## 2025-01-21 DIAGNOSIS — S83.206A POSITIVE MCMURRAY TEST OF RIGHT KNEE, INITIAL ENCOUNTER: Primary | ICD-10-CM

## 2025-01-21 DIAGNOSIS — M23.91 LOCKED KNEE, RIGHT: ICD-10-CM

## 2025-01-21 PROCEDURE — 73564 X-RAY EXAM KNEE 4 OR MORE: CPT | Performed by: PHYSICIAN ASSISTANT

## 2025-01-21 PROCEDURE — 3008F BODY MASS INDEX DOCD: CPT | Performed by: PHYSICIAN ASSISTANT

## 2025-01-21 PROCEDURE — 99214 OFFICE O/P EST MOD 30 MIN: CPT | Performed by: PHYSICIAN ASSISTANT

## 2025-01-21 PROCEDURE — 73562 X-RAY EXAM OF KNEE 3: CPT | Performed by: PHYSICIAN ASSISTANT

## 2025-01-21 NOTE — H&P
[facility] - ORTHOPEDICS  [address] [place] [address]  [phone]     NEW PATIENT VISIT - HISTORY AND PHYSICAL EXAMINATION     Name: Miguel Angel Guajardo   MRN: [id number]  Date: 1/21/2025     CC: Right knee pain.     REFERRED BY: Stiven Mai MD    HPI:   Miguel Angel Guajardo is a very pleasant 59 year old male who presents today for evaluation, consultation, and management of right knee pain and discomfort after putting boots on, and he felt a \"snap\" in his knee, with difficulty weightbearing for four days. He notes swelling, and stiffness. Pain with knee bending. Limited ROM, swelling.       PMH:   Past Medical History:    Arrhythmia    a fib    Belching    Esophageal reflux    Heartburn    Indigestion    Shortness of breath    cardioversion in late feb stopped this    Wears glasses       PAST SURGICAL HX:  Past Surgical History:   Procedure Laterality Date    Cardioversion  02/28/2024    Colonoscopy  12/2022    Hernia surgery      Needle biopsy left         FAMILY HX:  Family History   Problem Relation Age of Onset    Cancer Father     Diabetes Mother     Diabetes Son     Dementia Maternal Grandmother        ALLERGIES:  Penicillins    MEDICATIONS:   Current Outpatient Medications   Medication Sig Dispense Refill    PANTOPRAZOLE 40 MG Oral Tab EC TAKE ONE TABLET BY MOUTH ONCE DAILY, 30 MINUTES PRIOR TO BREAKFAST. 90 tablet 0    betamethasone valerate 0.1 % External Ointment       amiodarone 200 MG Oral Tab Take 1 tablet (200 mg total) by mouth daily. 90 tablet 1    Multiple Vitamins-Minerals (MULTIVITAMIN GUMMIES ADULTS OR) Take 2 tablets by mouth daily.      [employer] F[employer] GUMMIES OR Take 1-2 tablets by mouth daily.      Levalbuterol Tartrate (XOPENEX HFA) 45 MCG/ACT Inhalation Aerosol Inhale 2 puffs into the lungs every 4 (four) hours as needed for Wheezing. 1 each 1    dilTIAZem ER (CARDIZEM CD) 120 MG Oral Capsule SR 24 Hr Take 1 capsule (120 mg total) by mouth daily. 30  capsule 0    fluticasone propionate 110 MCG/ACT Inhalation Aerosol Inhale 2 puffs into the lungs 2 (two) times daily. 12 g 0    apixaban 5 MG Oral Tab Take 2 tabs (10mg) by mouth twice daily for 7 days, then take 1 tab (5mg) by mouth twice daily thereafter. 74 tablet 0    Lidocaine 4 % External Ointment Apply 50 g topically in the morning, at noon, and at bedtime. 50 g 0    fluticasone propionate 50 MCG/ACT Nasal Suspension by Each Nare route daily.      cetirizine 10 MG Oral Tab Take 1 tablet (10 mg total) by mouth daily.         ROS: A comprehensive 14 point review of systems was performed and was negative aside from the aforementioned per history of present illness.    SOCIAL HX:  Social History     Occupational History    Not on file   Tobacco Use    Smoking status: Never    Smokeless tobacco: Never   Vaping Use    Vaping status: Never Used   Substance and Sexual Activity    Alcohol use: Yes     Alcohol/week: 6.0 standard drinks of alcohol     Types: 6 Cans of beer per week    Drug use: Never    Sexual activity: Not on file       PE:   Vitals:    01/21/25 1449   Weight: 228 lb (103.4 kg)   Height: 6' 3\" (1.905 m)     Estimated body mass index is 28.5 kg/m² as calculated from the following:    Height as of this encounter: 6' 3\" (1.905 m).    Weight as of this encounter: 228 lb (103.4 kg).    Physical Exam  Constitutional:       Appearance: Normal appearance.   HENT:      Head: Normocephalic and atraumatic.   Eyes:      Extraocular Movements: Extraocular movements intact.   Neck:      Musculoskeletal: Normal range of motion and neck supple.   Cardiovascular:      Pulses: Normal pulses.   Pulmonary:      Effort: Pulmonary effort is normal. No respiratory distress.   Abdominal:      General: There is no distension.   Skin:     General: Skin is warm.      Capillary Refill: Capillary refill takes less than 2 seconds.      Findings: No bruising.   Neurological:      General: No focal deficit present.      Mental  Status: Alert.   Psychiatric:         Mood and Affect: Mood normal.     Examination of the right knee demonstrates:     Skin is intact, warm and dry.   Atrophy: none    Effusion: none    Joint line tenderness: medial  Crepitation: none   Kalani: Positive   Patellar mobility: normal without apprehension  J-sign: none    ROM: Extension full  Flexion 90  ACL:  Negative Lachman, Negative Pivot Shift   PCL:  Negative Posterior Drawer  Collateral Ligaments: Stable to Varus and Valgus stress at 0 and 30 degrees  Strength: mild weakness   Hip joint: normal pain-free ROM   Gait:  mildly antalgic   Leg length: equal and symmetric  Alignment:  neutral     No obvious peripheral edema noted.   Distal neurovascular exam demonstrates normal perfusion, intact sensation to light touch and full strength.     Examination of the contralateral knee demonstrates:  No significant atrophy, swelling or effusion. Full range of motion. Neurovascularly intact distally.    Radiographic Examination/Diagnostics:  I personally viewed, independently interpreted and radiology report was reviewed.      1/21/2025- Right Knee:     Impression   CONCLUSION:  Negative for fracture or malalignment.  Normal mineralization.  Moderate joint space loss of the patellofemoral compartment.  No patellar subluxation.  Trace suprapatellar joint effusion.  On the lateral view there is a round mineralized  density projecting within Hoffa's fat pad measuring 8 x 7 mm, possibly an intra-articular body.          IMPRESSION: Miguel Angel Guajardo is a 59 year old male who presents with right knee pain concerning for loose body/meniscus tear.     PLAN:   We had a detailed discussion outlining the etiology, anatomy, pathophysiology, and natural history of the patient's findings. Imaging was reviewed in detail and correlated to a 3-dimensional model of the patient's pathology.     In light of the acute traumatic incident, loss of normal function, and  failure to progress  conservatively we recommend an MRI to evaluate the integrity of the patient's right knee loose body due to his mechanical locking. The patient will follow up after imaging.   Differential diagnosis includes but not limited to: cartilage injury/loose body, meniscus tear/injury, ACL tear, bone marrow edema, and osteoarthritis.     External records were also reviewed for pertinent historical findings contributing to the patients undiagnosed new problem with uncertain prognosis.     The patient had the opportunity to ask questions, and all questions were answered appropriately.           FOLLOW-UP:  Return to clinic following completion of MRI to review scan and findings.             Silver Brantley [facility], PA-C Orthopedic Surgery / Sports Medicine Specialist  [facility]  [address] [place] [address].org  [email or web address]  t: [phone]  o: [phone]  f: [phone]    This note was dictated using Dragon software.  While it was briefly proofread prior to completion, some grammatical, spelling, and word choice errors due to dictation may still occur.

## 2025-01-31 ENCOUNTER — OFFICE VISIT (OUTPATIENT)
Dept: INTERNAL MEDICINE CLINIC | Facility: CLINIC | Age: 60
End: 2025-01-31
Payer: COMMERCIAL

## 2025-01-31 VITALS
HEIGHT: 74.5 IN | BODY MASS INDEX: 28.72 KG/M2 | TEMPERATURE: 98 F | SYSTOLIC BLOOD PRESSURE: 128 MMHG | WEIGHT: 226.19 LBS | HEART RATE: 90 BPM | DIASTOLIC BLOOD PRESSURE: 70 MMHG | OXYGEN SATURATION: 98 % | RESPIRATION RATE: 16 BRPM

## 2025-01-31 DIAGNOSIS — I34.0 NONRHEUMATIC MITRAL VALVE REGURGITATION: ICD-10-CM

## 2025-01-31 DIAGNOSIS — R73.03 PREDIABETES: ICD-10-CM

## 2025-01-31 DIAGNOSIS — Z00.00 ROUTINE GENERAL MEDICAL EXAMINATION AT A HEALTH CARE FACILITY: Primary | ICD-10-CM

## 2025-01-31 DIAGNOSIS — K21.00 GASTROESOPHAGEAL REFLUX DISEASE WITH ESOPHAGITIS WITHOUT HEMORRHAGE: ICD-10-CM

## 2025-01-31 DIAGNOSIS — K20.80 CORROSIVE ESOPHAGITIS: ICD-10-CM

## 2025-01-31 DIAGNOSIS — I34.0 MILD MITRAL REGURGITATION: ICD-10-CM

## 2025-01-31 DIAGNOSIS — K82.4 GALLBLADDER POLYP: ICD-10-CM

## 2025-01-31 DIAGNOSIS — K20.90 CHEMICAL ESOPHAGITIS: ICD-10-CM

## 2025-01-31 DIAGNOSIS — K76.0 NAFLD (NONALCOHOLIC FATTY LIVER DISEASE): ICD-10-CM

## 2025-01-31 DIAGNOSIS — K22.2 ESOPHAGEAL STRICTURE: ICD-10-CM

## 2025-01-31 DIAGNOSIS — R12 HEARTBURN: ICD-10-CM

## 2025-01-31 DIAGNOSIS — I48.91 ATRIAL FIBRILLATION STATUS POST CARDIOVERSION (HCC): ICD-10-CM

## 2025-01-31 DIAGNOSIS — M17.12 PRIMARY OSTEOARTHRITIS OF LEFT KNEE: ICD-10-CM

## 2025-01-31 DIAGNOSIS — K64.5 THROMBOSED HEMORRHOIDS: ICD-10-CM

## 2025-01-31 PROBLEM — M25.562 CHRONIC PAIN OF LEFT KNEE: Status: RESOLVED | Noted: 2024-01-19 | Resolved: 2025-01-31

## 2025-01-31 PROBLEM — R06.09 CHRONIC DYSPNEA: Status: RESOLVED | Noted: 2024-01-22 | Resolved: 2025-01-31

## 2025-01-31 PROBLEM — R07.89 ATYPICAL CHEST PAIN: Status: RESOLVED | Noted: 2024-03-07 | Resolved: 2025-01-31

## 2025-01-31 PROBLEM — J32.9 RECURRENT SINUSITIS: Status: RESOLVED | Noted: 2024-01-22 | Resolved: 2025-01-31

## 2025-01-31 PROBLEM — G89.29 CHRONIC PAIN OF LEFT KNEE: Status: RESOLVED | Noted: 2024-01-19 | Resolved: 2025-01-31

## 2025-01-31 PROBLEM — Z12.11 SPECIAL SCREENING FOR MALIGNANT NEOPLASM OF COLON: Status: RESOLVED | Noted: 2022-12-16 | Resolved: 2025-01-31

## 2025-01-31 PROBLEM — R53.83 FATIGUE: Status: RESOLVED | Noted: 2024-01-22 | Resolved: 2025-01-31

## 2025-01-31 PROBLEM — M53.3 COCCYDYNIA: Status: RESOLVED | Noted: 2021-10-18 | Resolved: 2025-01-31

## 2025-01-31 PROBLEM — M75.122 NONTRAUMATIC COMPLETE TEAR OF LEFT ROTATOR CUFF: Status: RESOLVED | Noted: 2024-04-29 | Resolved: 2025-01-31

## 2025-01-31 PROBLEM — R73.9 HYPERGLYCEMIA: Status: RESOLVED | Noted: 2024-01-19 | Resolved: 2025-01-31

## 2025-01-31 PROBLEM — J30.2 SEASONAL ALLERGIES: Status: RESOLVED | Noted: 2022-11-15 | Resolved: 2025-01-31

## 2025-01-31 PROCEDURE — 3078F DIAST BP <80 MM HG: CPT | Performed by: INTERNAL MEDICINE

## 2025-01-31 PROCEDURE — 3074F SYST BP LT 130 MM HG: CPT | Performed by: INTERNAL MEDICINE

## 2025-01-31 PROCEDURE — 90715 TDAP VACCINE 7 YRS/> IM: CPT | Performed by: INTERNAL MEDICINE

## 2025-01-31 PROCEDURE — 99396 PREV VISIT EST AGE 40-64: CPT | Performed by: INTERNAL MEDICINE

## 2025-01-31 PROCEDURE — 90471 IMMUNIZATION ADMIN: CPT | Performed by: INTERNAL MEDICINE

## 2025-01-31 PROCEDURE — 3008F BODY MASS INDEX DOCD: CPT | Performed by: INTERNAL MEDICINE

## 2025-01-31 NOTE — PROGRESS NOTES
Miguel Angel Guajardo  6/11/1965    Chief Complaint   Patient presents with    Physical       HPI:   Miguel Angel Guajardo is a 59 year old male who presents for an annual physical examination.    The patient reports improved his symptoms of left shoulder pain following arthroscopy by the Ortho service.  He is undergoing evaluation for a right knee pain with MRI pending completion.  Hemoglobin A1c remains elevated currently at 6.2%.  LDL cholesterol is 49.  AST is 54 and ALT is 84.    Current Outpatient Medications   Medication Sig Dispense Refill    PANTOPRAZOLE 40 MG Oral Tab EC TAKE ONE TABLET BY MOUTH ONCE DAILY, 30 MINUTES PRIOR TO BREAKFAST. 90 tablet 0    betamethasone valerate 0.1 % External Ointment       Multiple Vitamins-Minerals (MULTIVITAMIN GUMMIES ADULTS OR) Take 2 tablets by mouth daily.      [employer] F[employer] GUMMIES OR Take 1-2 tablets by mouth daily.      dilTIAZem ER (CARDIZEM CD) 120 MG Oral Capsule SR 24 Hr Take 1 capsule (120 mg total) by mouth daily. 30 capsule 0    fluticasone propionate 110 MCG/ACT Inhalation Aerosol Inhale 2 puffs into the lungs 2 (two) times daily. 12 g 0    apixaban 5 MG Oral Tab Take 2 tabs (10mg) by mouth twice daily for 7 days, then take 1 tab (5mg) by mouth twice daily thereafter. 74 tablet 0    Lidocaine 4 % External Ointment Apply 50 g topically in the morning, at noon, and at bedtime. 50 g 0    fluticasone propionate 50 MCG/ACT Nasal Suspension by Each Nare route daily.      cetirizine 10 MG Oral Tab Take 1 tablet (10 mg total) by mouth daily.        Allergies[1]   Past Medical History:    Arrhythmia    a fib    Belching    Esophageal reflux    Heartburn    Indigestion    Shortness of breath    cardioversion in late feb stopped this    Wears glasses      Patient Active Problem List   Diagnosis    Coccydynia    Gastroesophageal reflux disease with esophagitis without hemorrhage    Seasonal allergies    Heartburn    Esophagitis    Esophageal stricture    Special screening  for malignant neoplasm of colon    Thrombosed hemorrhoids    Gallbladder polyp    Prediabetes    NAFLD (nonalcoholic fatty liver disease)    Primary osteoarthritis of left knee    Chronic pain of left knee    Hyperglycemia    Nontraumatic complete tear of left rotator cuff    Atrial fibrillation (HCC)    Atypical chest pain    Chronic dyspnea    Mild mitral valve regurgitation    Fatigue    Recurrent sinusitis    Chemical esophagitis      Past Surgical History:   Procedure Laterality Date    Cardioversion  02/28/2024    Colonoscopy  12/2022    Hernia surgery      Needle biopsy left      Rotator cuff repair Left       Family History   Problem Relation Age of Onset    Cancer Father     Diabetes Mother     Diabetes Son     Dementia Maternal Grandmother       Social History     Socioeconomic History    Marital status:    Tobacco Use    Smoking status: Never    Smokeless tobacco: Never   Vaping Use    Vaping status: Never Used   Substance and Sexual Activity    Alcohol use: Yes     Alcohol/week: 6.0 standard drinks of alcohol     Types: 6 Cans of beer per week    Drug use: Never   Other Topics Concern    Caffeine Concern Yes    Seat Belt Yes    Weight Concern Yes     Social Drivers of Health     Food Insecurity: No Food Insecurity (12/29/2023)    Received from "[employer]    Hunger Screening     Within the past 12 months we worried whether our food would run out before we got money to buy more.: Never True     Within the past 12 months the food we bought just didn't last and we didn't have money to get more.: Never True         REVIEW OF SYSTEMS:   GENERAL: feels well otherwise  SKIN: no rashes  EYES:denies blurred vision or double vision  HEENT: Not congested  LUNGS: denies shortness of breath with exertion  CARDIOVASCULAR: denies chest pain on exertion  GI: no nausea or abdominal pain  NEURO: denies headaches    EXAM:   /70 (BP Location: Right arm, Patient Position:  Sitting, Cuff Size: adult)   Pulse 90   Temp 97.7 °F (36.5 °C) (Temporal)   Resp 16   Ht 6' 2.5\" (1.892 m)   Wt 226 lb 3.2 oz (102.6 kg)   SpO2 98%   BMI 28.65 kg/m²   GENERAL: Well developed, well nourished,in no apparent distress  SKIN: No rashes,no suspicious lesions  EYES: Bilateral conjunctiva are clear  HEENT: atraumatic, normocephalic. TM WNL BL.  NECK: supple,no adenopathy,no bruits  LUNGS: clear to auscultation  CARDIO: RRR without murmur  GI: good BS's,no masses, HSM or tenderness    ASSESSMENT AND PLAN:   Miguel Angel Guajardo is a 59 year old male who presents for an annual physical examination.    Outstanding screening and preventive measures:  Shingles immunization: Will obtain from pharmacy  Tetanus immunization: Tdap administered today    Acute right knee pain:  Following with ortho service  MRI scheduled    Atrial fibrillation:  Post cardioversion  Current NSR  Compliant with diltiazem and Eliquis  Following with cardiology service     Nonrheumatic MR:  Stable and asymptomatic  Following with cardiology service     Chronic left knee pain:  Post PT x four sessions without improvement  Xray with mild primary OA  Referred to ortho service for further evaluation     External hemorrhoids:  Post thrombosis  Infrequent symptoms and no current need for topical therapy  Following with general surgery service     Prediabetes:  Stable with HbA1c of 6.2%  Dietary and lifestyle management reinforced     GERD:  Complicated by esophagitis and esophageal stricture  Stable and controlled with use of pantoprazole 40 mg daily  Following with GI service    Gallbladder polyp:  Following with GI service     NAFLD:  Mild transaminitis  Undergoing surveillance ultrasound evaluation  Following with GI service    The patient indicates understanding of these issues and agrees to the plan.    TODAY'S ORDERS     Orders Placed This Encounter   Procedures    TdaP (Boostrix) Vaccine (> 7 Y)       Meds & Refills:  Requested  Prescriptions      No prescriptions requested or ordered in this encounter       Imaging & Consults:  TETANUS, DIPHTHERIA TOXOIDS AND ACELLULAR PERTUSIS VACCINE (TDAP), >7 YEARS, IM USE    No follow-ups on file.  There are no Patient Instructions on file for this visit.    All questions were answered and the patient agrees with the plan.     Thank you,  Stiven Mai MD       [1]   Allergies  Allergen Reactions    Penicillins OTHER (SEE COMMENTS) and ANAPHYLAXIS     Convulsions

## 2025-02-13 ENCOUNTER — HOSPITAL ENCOUNTER (OUTPATIENT)
Dept: MRI IMAGING | Facility: HOSPITAL | Age: 60
Discharge: HOME | End: 2025-02-13
Attending: PHYSICIAN ASSISTANT
Payer: COMMERCIAL

## 2025-02-13 DIAGNOSIS — S83.206A POSITIVE MCMURRAY TEST OF RIGHT KNEE, INITIAL ENCOUNTER: ICD-10-CM

## 2025-02-13 DIAGNOSIS — M23.91 LOCKED KNEE, RIGHT: ICD-10-CM

## 2025-02-13 DIAGNOSIS — M23.41 LOOSE BODY OF RIGHT KNEE: ICD-10-CM

## 2025-02-13 PROCEDURE — 73721 MRI JNT OF LWR EXTRE W/O DYE: CPT | Performed by: PHYSICIAN ASSISTANT

## 2025-02-24 ENCOUNTER — OFFICE VISIT (OUTPATIENT)
Dept: ORTHOPEDICS CLINIC | Facility: CLINIC | Age: 60
End: 2025-02-24
Payer: COMMERCIAL

## 2025-02-24 DIAGNOSIS — S83.206A POSITIVE MCMURRAY TEST OF RIGHT KNEE, INITIAL ENCOUNTER: Primary | ICD-10-CM

## 2025-02-24 DIAGNOSIS — M17.11 PRIMARY OSTEOARTHRITIS OF RIGHT KNEE: ICD-10-CM

## 2025-02-24 DIAGNOSIS — M23.41 LOOSE BODY IN KNEE, RIGHT KNEE: ICD-10-CM

## 2025-02-24 PROCEDURE — 99213 OFFICE O/P EST LOW 20 MIN: CPT | Performed by: PHYSICIAN ASSISTANT

## 2025-02-24 NOTE — PROGRESS NOTES
[facility] - ORTHOPEDICS  [address] [place] [id number]  [phone]       Name: Miguel Angel Guajardo   MRN: [id number]  Date: 2/24/2025     REASON FOR VISIT: Follow up for right knee pain.     INTERVAL HISTORY:  Miguel Angel Guajardo is a 59 year old male who returns for evaluation of right knee pain.     To summarize, right knee pain and discomfort after putting boots on, and he felt a \"snap\" in his knee, with difficulty weightbearing for four days. He notes swelling, and stiffness. Pain with knee bending. Limited ROM, swelling.     At the patient's last visit we recommended physical therapy.       ROS: ROS    PE:   There were no vitals filed for this visit.  Estimated body mass index is 28.65 kg/m² as calculated from the following:    Height as of 1/31/25: 6' 2.5\" (1.892 m).    Weight as of 1/31/25: 226 lb 3.2 oz (102.6 kg).    Physical Exam  Constitutional:       Appearance: Normal appearance.   HENT:      Head: Normocephalic and atraumatic.   Eyes:      Extraocular Movements: Extraocular movements intact.   Neck:      Musculoskeletal: Normal range of motion and neck supple.   Cardiovascular:      Pulses: Normal pulses.   Pulmonary:      Effort: Pulmonary effort is normal. No respiratory distress.   Abdominal:      General: There is no distension.   Skin:     General: Skin is warm.      Capillary Refill: Capillary refill takes less than 2 seconds.      Findings: No bruising.   Neurological:      General: No focal deficit present.      Mental Status: She is alert.   Psychiatric:         Mood and Affect: Mood normal.       Physical Exam  Constitutional:       Appearance: Normal appearance.   HENT:      Head: Normocephalic and atraumatic.   Eyes:      Extraocular Movements: Extraocular movements intact.   Neck:      Musculoskeletal: Normal range of motion and neck supple.   Cardiovascular:      Pulses: Normal pulses.   Pulmonary:      Effort: Pulmonary effort is normal. No  respiratory distress.   Abdominal:      General: There is no distension.   Skin:     General: Skin is warm.      Capillary Refill: Capillary refill takes less than 2 seconds.      Findings: No bruising.   Neurological:      General: No focal deficit present.      Mental Status: Alert.   Psychiatric:         Mood and Affect: Mood normal.      Examination of the right knee demonstrates:      Skin is intact, warm and dry.   Atrophy: none    Effusion: none    Joint line tenderness: medial  Crepitation: none   Kalani: Positive   Patellar mobility: normal without apprehension  J-sign: none    ROM: Extension full  Flexion 90  ACL:  Negative Lachman, Negative Pivot Shift   PCL:  Negative Posterior Drawer  Collateral Ligaments: Stable to Varus and Valgus stress at 0 and 30 degrees  Strength: mild weakness   Hip joint: normal pain-free ROM   Gait:  mildly antalgic   Leg length: equal and symmetric  Alignment:  neutral      No obvious peripheral edema noted.   Distal neurovascular exam demonstrates normal perfusion, intact sensation to light touch and full strength.      Examination of the contralateral knee demonstrates:  No significant atrophy, swelling or effusion. Full range of motion. Neurovascularly intact distally.       Radiographic Examination/Diagnostics:    I personally viewed, independently interpreted and radiology report was reviewed.    MRI KNEE, RIGHT (CHP=50401)    Result Date: 2/14/2025  PROCEDURE:  MRI KNEE, RIGHT (MAY=67963)  COMPARISON:  [facility], XR, XR KNEE (3 VIEWS), LEFT (CPT=73562), 1/21/2025, 2:57 PM.  INDICATIONS:  M23.41 Loose body of right knee M23.91 Locked knee, right S83.206A Positive Kalani test of right knee, initial encounter  TECHNIQUE:  Axial, coronal, and sagittal proton density with and without fat saturation images were obtained.  PATIENT STATED HISTORY: (As transcribed by Technologist)  Loose body of right knee, Locked knee, right    FINDINGS:  LIGAMENTS:          There is cystic  degeneration of ACL without tear.  The PCL is intact.  Increased signal within the MCL is consistent with grade 1 sprain.  The lateral ligamentous complex is intact. MENISCI:            There is a radial defect posterior horn medial meniscus.  There is intrasubstance degeneration lateral meniscus without tear. TENDONS:            The tendinous insertions about the knee are intact without significant tendinosis or tears. MUSCULATURE:        No evidence of strain, edema, or atrophy. BONY COMPARTMENTS:  There is a moderate joint effusion.  There is a Baker's cyst noted.  There is high-grade loss of articular cartilage over the entire articular surface of the patellofemoral compartment of knee joint.  There is multifocal fissuring and  thinning of articular cartilage in the medial compartment.  There is subchondral reactive edema in the medial compartment.  There is an intra-articular ossific body noted anterior to the anterior horn medial meniscus which is seen on the sagittal T1 sequence series 9, image 15 to measure approximately 1 cm in diameter. SYNOVIUM:           There is moderate joint effusion and Baker's cyst noted.             CONCLUSION:  1. Radial tear posterior horn medial meniscus. 2. 1 cm intra-articular ossific body noted anterior to the anterior horn of medial meniscus. 3. High-grade loss of articular cartilage patellofemoral compartment of knee joint. 4. Moderate thinning of articular cartilage medial compartment of knee joint. 5. Grade 1 sprain of MCL. 6. Moderate joint effusion and Baker's cyst.    LOCATION:  [facility]          Dictated by (CST): Brant Taylor MD on 2/14/2025 at 10:18 AM     Finalized by (CST): Brant Taylor MD on 2/14/2025 at 10:23 AM         IMPRESSION: Miguel Angel Guajardo is a 59 year old male who presented for follow up of right knee medial meniscus tear, loose body, and PF osteaorthritis.     PLAN:   We had a detailed discussion outlining the etiology, anatomy, pathophysiology,  and natural history of the patient's findings.    We reviewed the treatment of this disease condition. He may require arthroscopy in the future, but at this time will focus on maximizing conservative treatment. We recommended physical therapy to aid in strengthening, range of motion, functional improvement, and return to baseline activity.  The patient had opportunity to ask questions and all questions were answered appropriately.    FOLLOW-UP:  Return to clinic on an as needed basis.             WANDY Mahan, PA-C Orthopedic Surgery / Sports Medicine Specialist  [facility]  [address] [place] [address].[email or web address]  [email or web address]  t: [phone]  o: [phone]  f: [phone]    This note was dictated using Dragon software.  While it was briefly proofread prior to completion, some grammatical, spelling, and word choice errors due to dictation may still occur.

## 2025-04-25 ENCOUNTER — HOSPITAL ENCOUNTER (OUTPATIENT)
Dept: ULTRASOUND IMAGING | Age: 60
Discharge: HOME | End: 2025-04-25
Attending: STUDENT IN AN ORGANIZED HEALTH CARE EDUCATION/TRAINING PROGRAM
Payer: COMMERCIAL

## 2025-04-25 DIAGNOSIS — K76.0 FATTY LIVER: ICD-10-CM

## 2025-04-25 PROCEDURE — 76705 ECHO EXAM OF ABDOMEN: CPT | Performed by: STUDENT IN AN ORGANIZED HEALTH CARE EDUCATION/TRAINING PROGRAM

## 2025-05-05 NOTE — PROGRESS NOTES
Raza Michelle,    Your recent ultrasound shows simple liver cysts, a BENIGN finding.     Your recent ultrasound showed a fatty liver.  This is where extra fat deposits within the liver. However, if fatty liver is left untreated, this can potentially lead to other liver and heart issues and conditions.     I recommend that you follow a low fat and low calorie diet, with a diet high in fruits and vegetables. I also recommend that you lose about 8-10% of your body weight with diet and exercise in the next 6-12 months, to help minimize the risk of fatty liver.     I also recommend further evaluation of this finding with a special type of liver ultrasound known as Elastography, which can measure if there is a significant amount of steatosis (fat) or fibrosis (scarring) within your liver.     You had this completed about 2 years ago (March 2023) and this will let us know if there have been any interval changes.     Please let me know if you have any questions or concerns.     Sincerely,  Cory Cartagena MD

## 2025-07-22 ENCOUNTER — HOSPITAL ENCOUNTER (OUTPATIENT)
Dept: ULTRASOUND IMAGING | Age: 60
Discharge: HOME | End: 2025-07-22
Attending: STUDENT IN AN ORGANIZED HEALTH CARE EDUCATION/TRAINING PROGRAM

## 2025-07-22 DIAGNOSIS — K76.0 FATTY LIVER: ICD-10-CM

## 2025-07-22 PROCEDURE — 76705 ECHO EXAM OF ABDOMEN: CPT | Performed by: STUDENT IN AN ORGANIZED HEALTH CARE EDUCATION/TRAINING PROGRAM

## 2025-07-22 PROCEDURE — 76981 USE PARENCHYMA: CPT | Performed by: STUDENT IN AN ORGANIZED HEALTH CARE EDUCATION/TRAINING PROGRAM

## (undated) NOTE — LETTER
23    Patient: Ross Martínez  : 1965 Visit date: 2023    Dear  Rachel Larsen MD    Thank you for referring Ross Martínez to my practice. Please find my assessment and plan below. Assessment   Gallbladder polyp  (primary encounter diagnosis)      Plan     The patient has incidental, minuscule, asymptomatic gallbladder polyp. The patient has no signs or symptoms of biliary pain or discomfort. Gallbladder polyps do not meet size criteria for intervention now. No surgery required. Routine follow-up at 6 to 12-month intervals to document stability by ultrasound. The patient is encouraged to return to my attention if he experiences signs or symptoms related to biliary pathology which we discussed. I recommend the patient continue seeing his primary care physician and gastroenterologist as recommended for ongoing medical needs. The patient was provided ample opportunity to ask questions. All of the patient's questions were answered in detail. The patient voiced understanding of the care plan.       Sincerely,       Megan Silva MD   CC:   No Recipients

## (undated) NOTE — LETTER
08/15/23    Patient: Miley Chau  : 1965 Visit date: 2023    Dear  Estee Good MD    Thank you for referring Miley Chau to my practice. Please find my assessment and plan below. Assessment   Thrombosed hemorrhoids  (primary encounter diagnosis)  This is a very nice 61-year-old gentleman who I met on 7/10/2023 when he was referred to me from his primary care physician, Dr. Gideon Luevano, with rectal pain secondary to a thrombosed hemorrhoid. I recommended nonoperative therapy with Anusol, sitz bath's and high-fiber diet. Patient is doing very well at this time. He is no longer having any anorectal pain. He is no longer using any topical medication. He is not having any bleeding, prolapse, seepage, itching or incontinence. He is still doing sitz bath's. Patient has no evidence of residual external hemorrhoidal disease on exam today. Plan   I recommend he continues a high-fiber diet, continues to drink plenty of water and adds a daily fiber supplement. No plans for any anorectal procedures or intervention as he is completely asymptomatic at this time. I advised him to follow-up with me as needed if he has any return of anorectal symptoms. Patient expressed understanding and is agreeable to plan.          Sincerely,       Betito Concepcion MD   CC: No Recipients

## (undated) NOTE — LETTER
07/10/23    Patient: Naldo Messer  : 1965 Visit date: 7/10/2023    Dear  Jake Goodrich MD    Thank you for referring Naldo Messer to my practice. Please find my assessment and plan below. Assessment   Thrombosed hemorrhoids  (primary encounter diagnosis)  This is a very nice 51-year-old gentleman referred to me from his primary care physician, Dr. Kimberlyn Mccrary, with concern for external hemorrhoids. Around 10 days ago, patient noticed a painful lump near his anus. It began bleeding and soaking through his undergarments and pants 5 days ago prompting him to go to urgent care. He followed up with his primary care provider, Dr. Kimberlyn Mccrary 3 days ago. He is using Anusol topical treatment to the area and it has reduced in size from around 2 peas to 1 pea. He continues to have anorectal discomfort. He continues to have some bleeding after bowel movements with wiping. No prior history of hemorrhoids. No prior anorectal surgery. No blood thinners. He is up-to-date on his colonoscopies with most recent 2022 with Dr. Georgie Cruz and was given a 3-year recall. Of note, patient's wife is a retired speech pathologist.  He denies any prolapse, seepage, itching or incontinence. He generally has daily bowel movements. He is not taking any fiber supplements, laxatives or stool softeners. He does have a history of GERD and what sounds like irritable bowel syndrome. On bedside exam, there is evidence of a thrombosed left lateral external hemorrhoid. Exam was limited by pain and no digital rectal exam or anoscopy was able to be performed during today's visit. His symptoms seem to be improving with conservative management. Plan   I recommend ongoing nonoperative treatment for the thrombosed hemorrhoid. He can continue using the Anusol ointment for another 1 week or so and then I will transition to lidocaine ointment as needed.   I recommend he takes sitz bath's 2-3 times a day and after bowel movements if possible. I gave him a handout on high-fiber diet and fiber supplements. I will I see him back in 1 month's time for ongoing follow-up. I did  him that the external hemorrhoid may turn into a skin tag and if this remains symptomatic, we can consider excising it in the future. Any surgical procedure at this point would cause some even more pain and symptoms than what he is currently experiencing and therefore I do not recommend this. Return precautions in the meantime discussed. Patient and wife expressed understanding and were agreeable to plan.          Sincerely,       Krystal Gomez MD   CC:   No Recipients

## (undated) NOTE — LETTER
24      Orthopedic Surgery   Pre-Operative Clearance Request    Patient Name:   Miguel Angel Guajardo             :   1965    Surgeon: Dr. Brown             Date of Surgery: 2024    Surgical Procedure: LEFT SHOULDER ARTHROSCOPY ROTATOR CUFF REPAIR ARTHROSCOPIC BICEPS TENODESIS SUBACROMIAL DECOMPRESSION DISTAL CLAVICLE EXCISION       MUST COMPLETE ALL OF THE FOLLOWING 2-3 WEEKS PRIOR TO YOUR SURGERY TO AVOID CANCELLATION, DUE TO THE RULE THEIR WILL BE NO EXCEPTIONS!      [x]  History and Physical      [x]  Medical  Clearance                                                                                                      **Please fax test results, H&P, and clearance to [phone] and to P.A.T at [phone]*